# Patient Record
Sex: FEMALE | Race: WHITE | NOT HISPANIC OR LATINO | URBAN - METROPOLITAN AREA
[De-identification: names, ages, dates, MRNs, and addresses within clinical notes are randomized per-mention and may not be internally consistent; named-entity substitution may affect disease eponyms.]

---

## 2019-03-14 ENCOUNTER — OUTPATIENT (OUTPATIENT)
Dept: OUTPATIENT SERVICES | Facility: HOSPITAL | Age: 24
LOS: 1 days | Discharge: HOME | End: 2019-03-14

## 2019-03-14 DIAGNOSIS — R10.10 UPPER ABDOMINAL PAIN, UNSPECIFIED: ICD-10-CM

## 2019-08-08 ENCOUNTER — INPATIENT (INPATIENT)
Facility: HOSPITAL | Age: 24
LOS: 0 days | Discharge: LEFT BEFORE DISCHARGE | End: 2019-08-09
Attending: INTERNAL MEDICINE | Admitting: INTERNAL MEDICINE
Payer: COMMERCIAL

## 2019-08-08 VITALS
HEART RATE: 99 BPM | SYSTOLIC BLOOD PRESSURE: 147 MMHG | RESPIRATION RATE: 16 BRPM | OXYGEN SATURATION: 98 % | DIASTOLIC BLOOD PRESSURE: 73 MMHG | TEMPERATURE: 99 F

## 2019-08-08 DIAGNOSIS — Z90.49 ACQUIRED ABSENCE OF OTHER SPECIFIED PARTS OF DIGESTIVE TRACT: Chronic | ICD-10-CM

## 2019-08-08 LAB
ALBUMIN SERPL ELPH-MCNC: 3.5 G/DL — SIGNIFICANT CHANGE UP (ref 3.5–5.2)
ALP SERPL-CCNC: 153 U/L — HIGH (ref 30–115)
ALT FLD-CCNC: 13 U/L — SIGNIFICANT CHANGE UP (ref 0–41)
ANION GAP SERPL CALC-SCNC: 12 MMOL/L — SIGNIFICANT CHANGE UP (ref 7–14)
APPEARANCE UR: ABNORMAL
APTT BLD: 25.4 SEC — LOW (ref 27–39.2)
AST SERPL-CCNC: 15 U/L — SIGNIFICANT CHANGE UP (ref 0–41)
BACTERIA # UR AUTO: ABNORMAL
BASOPHILS # BLD AUTO: 0.02 K/UL — SIGNIFICANT CHANGE UP (ref 0–0.2)
BASOPHILS NFR BLD AUTO: 0.1 % — SIGNIFICANT CHANGE UP (ref 0–1)
BILIRUB SERPL-MCNC: 0.2 MG/DL — SIGNIFICANT CHANGE UP (ref 0.2–1.2)
BILIRUB UR-MCNC: NEGATIVE — SIGNIFICANT CHANGE UP
BUN SERPL-MCNC: 8 MG/DL — LOW (ref 10–20)
CALCIUM SERPL-MCNC: 9 MG/DL — SIGNIFICANT CHANGE UP (ref 8.5–10.1)
CHLORIDE SERPL-SCNC: 105 MMOL/L — SIGNIFICANT CHANGE UP (ref 98–110)
CO2 SERPL-SCNC: 21 MMOL/L — SIGNIFICANT CHANGE UP (ref 17–32)
COLOR SPEC: YELLOW — SIGNIFICANT CHANGE UP
CREAT SERPL-MCNC: 0.6 MG/DL — LOW (ref 0.7–1.5)
DIFF PNL FLD: NEGATIVE — SIGNIFICANT CHANGE UP
EOSINOPHIL # BLD AUTO: 0.07 K/UL — SIGNIFICANT CHANGE UP (ref 0–0.7)
EOSINOPHIL NFR BLD AUTO: 0.5 % — SIGNIFICANT CHANGE UP (ref 0–8)
EPI CELLS # UR: 12 /HPF — HIGH (ref 0–5)
GLUCOSE SERPL-MCNC: 81 MG/DL — SIGNIFICANT CHANGE UP (ref 70–99)
GLUCOSE UR QL: NEGATIVE — SIGNIFICANT CHANGE UP
HCT VFR BLD CALC: 34.8 % — LOW (ref 37–47)
HGB BLD-MCNC: 11.5 G/DL — LOW (ref 12–16)
HYALINE CASTS # UR AUTO: 13 /LPF — HIGH (ref 0–7)
IMM GRANULOCYTES NFR BLD AUTO: 0.7 % — HIGH (ref 0.1–0.3)
INR BLD: 0.93 RATIO — SIGNIFICANT CHANGE UP (ref 0.65–1.3)
KETONES UR-MCNC: NEGATIVE — SIGNIFICANT CHANGE UP
LEUKOCYTE ESTERASE UR-ACNC: ABNORMAL
LYMPHOCYTES # BLD AUTO: 26.5 % — SIGNIFICANT CHANGE UP (ref 20.5–51.1)
LYMPHOCYTES # BLD AUTO: 3.67 K/UL — HIGH (ref 1.2–3.4)
MCHC RBC-ENTMCNC: 26.3 PG — LOW (ref 27–31)
MCHC RBC-ENTMCNC: 33 G/DL — SIGNIFICANT CHANGE UP (ref 32–37)
MCV RBC AUTO: 79.6 FL — LOW (ref 81–99)
MONOCYTES # BLD AUTO: 0.87 K/UL — HIGH (ref 0.1–0.6)
MONOCYTES NFR BLD AUTO: 6.3 % — SIGNIFICANT CHANGE UP (ref 1.7–9.3)
NEUTROPHILS # BLD AUTO: 9.11 K/UL — HIGH (ref 1.4–6.5)
NEUTROPHILS NFR BLD AUTO: 65.9 % — SIGNIFICANT CHANGE UP (ref 42.2–75.2)
NITRITE UR-MCNC: NEGATIVE — SIGNIFICANT CHANGE UP
NRBC # BLD: 0 /100 WBCS — SIGNIFICANT CHANGE UP (ref 0–0)
PH UR: 6.5 — SIGNIFICANT CHANGE UP (ref 5–8)
PLATELET # BLD AUTO: 318 K/UL — SIGNIFICANT CHANGE UP (ref 130–400)
POTASSIUM SERPL-MCNC: 4.5 MMOL/L — SIGNIFICANT CHANGE UP (ref 3.5–5)
POTASSIUM SERPL-SCNC: 4.5 MMOL/L — SIGNIFICANT CHANGE UP (ref 3.5–5)
PROT SERPL-MCNC: 6.8 G/DL — SIGNIFICANT CHANGE UP (ref 6–8)
PROT UR-MCNC: SIGNIFICANT CHANGE UP
PROTHROM AB SERPL-ACNC: 10.7 SEC — SIGNIFICANT CHANGE UP (ref 9.95–12.87)
RBC # BLD: 4.37 M/UL — SIGNIFICANT CHANGE UP (ref 4.2–5.4)
RBC # FLD: 13.7 % — SIGNIFICANT CHANGE UP (ref 11.5–14.5)
RBC CASTS # UR COMP ASSIST: 5 /HPF — HIGH (ref 0–4)
SODIUM SERPL-SCNC: 138 MMOL/L — SIGNIFICANT CHANGE UP (ref 135–146)
SP GR SPEC: 1.01 — SIGNIFICANT CHANGE UP (ref 1.01–1.02)
TROPONIN T SERPL-MCNC: <0.01 NG/ML — SIGNIFICANT CHANGE UP
UROBILINOGEN FLD QL: SIGNIFICANT CHANGE UP
WBC # BLD: 13.84 K/UL — HIGH (ref 4.8–10.8)
WBC # FLD AUTO: 13.84 K/UL — HIGH (ref 4.8–10.8)
WBC UR QL: 51 /HPF — HIGH (ref 0–5)

## 2019-08-08 PROCEDURE — 93010 ELECTROCARDIOGRAM REPORT: CPT

## 2019-08-08 PROCEDURE — 99285 EMERGENCY DEPT VISIT HI MDM: CPT

## 2019-08-08 PROCEDURE — 70450 CT HEAD/BRAIN W/O DYE: CPT | Mod: 26

## 2019-08-08 PROCEDURE — 71045 X-RAY EXAM CHEST 1 VIEW: CPT | Mod: 26

## 2019-08-08 PROCEDURE — 99253 IP/OBS CNSLTJ NEW/EST LOW 45: CPT

## 2019-08-08 RX ORDER — OXYTOCIN 10 UNIT/ML
333.33 VIAL (ML) INJECTION
Qty: 20 | Refills: 0 | Status: DISCONTINUED | OUTPATIENT
Start: 2019-08-08 | End: 2019-08-08

## 2019-08-08 RX ORDER — NITROFURANTOIN MACROCRYSTAL 50 MG
100 CAPSULE ORAL
Refills: 0 | Status: DISCONTINUED | OUTPATIENT
Start: 2019-08-08 | End: 2019-08-09

## 2019-08-08 RX ORDER — CHLORHEXIDINE GLUCONATE 213 G/1000ML
1 SOLUTION TOPICAL
Refills: 0 | Status: DISCONTINUED | OUTPATIENT
Start: 2019-08-08 | End: 2019-08-09

## 2019-08-08 RX ORDER — SODIUM CHLORIDE 9 MG/ML
1000 INJECTION, SOLUTION INTRAVENOUS
Refills: 0 | Status: DISCONTINUED | OUTPATIENT
Start: 2019-08-08 | End: 2019-08-08

## 2019-08-08 NOTE — ED PROVIDER NOTE - OBJECTIVE STATEMENT
24 year old female  35 weeks pregnant presents to the ED with right sided facial and arm weakness, associated with difficulty speaking. Symptoms started acutely 40 minutes prior to arrival, lasted about 10 minutes and have not returned. Patient states she tried to speak but couldn't get the words out. Denies headaches, vision changes, neck pain/stiffness, chest pain, shortness of breath, abd pain, weakness, gait difficulty, facial droop, syncope, AMS, seizure activity. No prior hx of this. No anticoagulation.

## 2019-08-08 NOTE — ED PROVIDER NOTE - ATTENDING CONTRIBUTION TO CARE
25 y/o preg F at 35 wks p/w expressive aphasia and RUE and R facial numbness/ weakness about 40 min pta. resolved after 10 min. pt has no sx. No prior similar sx. No cp, sob, leg swelling, trauma. ROS PE above. stroke code called. Will fup imaging, labs, neuro reccs

## 2019-08-08 NOTE — H&P ADULT - ASSESSMENT
-24 year old female with history of migraine,  36 weeks pregnant presenting with transient aphasia and right sided numbness.          #  transient aphasia and right sided numbness.    DDx: TIA, complicated migraine, seizure  - NIHSS 0  - Admit to stroke unit  - Neurochecks q4 hours and vitals q4 hours   - ECHO   - MRI brain w/o FRANCIS   - MRA H+N w/o FRANCIS  - F/U hypercoagulable panel  - Call stroke code for any changes in neuro exam    # 36 week pregnant   -OB evaluation and ultrasound    # Regular Diet   # FULL CODE  # Dispo: from home -24 year old female with history of migraine,  36 weeks pregnant presenting with transient aphasia and right sided numbness.          #  transient aphasia and right sided numbness.    DDx: TIA, complicated migraine, seizure  - NIHSS 0  - Admit to stroke unit  - Neurochecks q4 hours and vitals q4 hours   - ECHO   - MRI brain w/o FRANCIS   - MRA H+N w/o FRANCIS  - F/U hypercoagulable panel  - Call stroke code for any changes in neuro exam    # Asymptomatic bacteriuria in pregnant pt  - nitrofurantoin    # 36 week pregnant   -OB evaluation and ultrasound    # Regular Diet   # FULL CODE  # Dispo: from home

## 2019-08-08 NOTE — ED ADULT TRIAGE NOTE - CHIEF COMPLAINT QUOTE
numbness, aphasia and slurred speech 30 mins ago, is currently 36 week pregnant, no acute distress at this time. no neuro deficit at this time, GCS of 15, stroke code activated.

## 2019-08-08 NOTE — ED PROVIDER NOTE - PHYSICAL EXAMINATION
VITALS:  I have reviewed the initial vital signs.  GENERAL: Well-developed, well-nourished, in no acute distress. Nontoxic.   HEENT: Sclera clear. No conjunctival injection. EOMI, PERRLA. Mucous membranes moist.  NECK: supple w FROM. No cervical adenopathy.  CARDIO: RRR, nl S1 and S2. No murmurs, rubs, or gallops. No peripheral edema. 2+ radial pulses bilaterally.  PULM: Normal effort. No tachypnea or retractions. CTA b/l without wheezes, rales, or rhonchi.  MSK: FROM to extremities x4. No joint swelling, erythema, or ttp.  GI: Normal bowel sounds. Abdomen soft, gravid, nontender throughout.   : No CVA tenderness b/l.  SKIN: Warm, dry. No pallor or rashes. Capillary refill <2 seconds.  NEURO: A&Ox3. Speech clear. CN II-XII intact. 5/5 strength to upper and lower extremities b/l. Sensation intact and equal throughout. No pronator drift. Finger to nose intact. Normal heel to shin.

## 2019-08-08 NOTE — ED PROVIDER NOTE - CLINICAL SUMMARY MEDICAL DECISION MAKING FREE TEXT BOX
25 y/o preg F w/ apparent TIA. CTH neg. NO acute ED events. pt admitted to stroke unit for further w/up.

## 2019-08-08 NOTE — H&P ADULT - HISTORY OF PRESENT ILLNESS
24 year old female with past medical history of Migraine, 36 weeks pregnant presents with episode  having difficulty speaking and right face, tongue and arm numbness.   patient was doing well until 30 mins prior to presentation when she had an episode of 10 mins in which she had difficulty speaking, associated with face tounge and arm numbness.   Symptoms lasted for 10 mins and resolved prior to arrival to the ED. She had a mild frontal headache afterwards.  She has a history in 2014 of having an episode in which she was confused while at work and lost vision in her right eye.  Was told it was a migraine.    ROS is otherwise negative.   IN ED, vital signs were stable, CTH was unremarkable and  NIHSS was 0 24 year old female with past medical history of Migraine, 36 weeks pregnant presents with episode  having difficulty speaking and right face, tongue and arm numbness.   patient was doing well until 30 mins prior to presentation when she had an episode of difficulty speaking, associated with face tounge and arm numbness, Symptoms lasted for 10 mins and resolved prior to arrival to the ED. She had a mild frontal headache afterwards.  She has a history in 2014 of having an episode in which she was confused while at work and lost vision in her right eye.  Was told it was a migraine.    ROS is otherwise negative.   IN ED, vital signs were stable, CTH was unremarkable and  NIHSS was 0

## 2019-08-08 NOTE — H&P ADULT - NSHPPHYSICALEXAM_GEN_ALL_CORE
PHYSICAL EXAM:  GENERAL: NAD, lying in bed comfortably  HEAD:  Atraumatic, Normocephalic  EYES: EOMI, PERRLA, conjunctiva and sclera clear  ENT: Moist mucous membranes  NECK: Supple, No JVD  CHEST/LUNG: Clear to auscultation bilaterally; No rales, rhonchi, wheezing, or rubs. Unlabored respirations  HEART: Regular rate and rhythm; No murmurs, rubs, or gallops  ABDOMEN: Soft, Nontender, pregnant uterus  EXTREMITIES:  2+ Peripheral Pulses, brisk capillary refill. No clubbing, cyanosis, or edema  NERVOUS SYSTEM:  Alert & Oriented X3, speech clear. No deficits   MSK: FROM all 4 extremities, full and equal strength  SKIN: No rashes or lesions

## 2019-08-08 NOTE — ED ADULT NURSE NOTE - OBJECTIVE STATEMENT
23 y/o female brought in for tingling. patient states at 345pm she developed tongue, right face, right hand numbness/tingling with slurred speech. Patient state symptoms resolved on its own in 15 minutes. Patient is currently 36 weeks pregnant, , no pmh. Patient denies chest pain, dizziness, headache, confusion, weakness.

## 2019-08-08 NOTE — H&P ADULT - ATTENDING COMMENTS
24 year old female with history of migraine,  36 weeks pregnant presenting with transient aphasia and right sided numbness, now completely resolved- likely due to complicated migraine    Pt seen and examined, spoke with     in excellent spirits; asymptomatic    chart reviewed- agree with above and neuro eval    stroke unit monitoring    neuro f/u    OB eval    plan as per neurology note    DC when cleared by neuro
No complaints

## 2019-08-08 NOTE — CONSULT NOTE ADULT - SUBJECTIVE AND OBJECTIVE BOX
**STROKE CODE CONSULT NOTE**    Last known well time/Time of onset of symptoms:    HPI: Shanna is currently 36 weeks pregnant and presents with episode while out with daughter in which she was having difficulty speaking and texting on her cell phone.  Says the text to her  did not make sense.  She then developed right face, tongue and arm numbness.  Symptoms resolved prior to arrival to the ED.  She had a mild frontal headache afterwards.  She has a history in 2014 of having an episode in which she was confused while at work and lost vision in her right eye.  Was told it was a migraine.  Currently asymptomatic but anxious and nervous    PAST MEDICAL & SURGICAL HISTORY:  No pertinent past medical history  No significant past surgical history      FAMILY HISTORY: non contributory      SOCIAL HISTORY: no smoking, drinking or drug use  Smoking Cesation: Discussed    ROS:  Constitutional: No fever, weight loss or fatigue  Eyes: No eye pain, visual disturbances, or discharge  ENMT:  No difficulty hearing, tinnitus, vertigo; No sinus or throat pain  Neck: No pain or stiffness  Respiratory: No cough, wheezing, chills or hemoptysis  Cardiovascular: No chest pain, palpitations, shortness of breath, dizziness or leg swelling  Gastrointestinal: No abdominal pain. No nausea, vomiting or hematemesis; No diarrhea or constipation. Nohematochezia.  Genitourinary: No dysuria, frequency, hematuria or incontinence  Neurological: As per HPI  Skin: No itching, burning, rashes or lesions   Endocrine: No heat or cold intolerance; No hair loss  Musculoskeletal: No joint pain or swelling; No muscle, back or extremity pain  Psychiatric: No depression, anxiety, mood swings or difficulty sleeping  Heme/Lymph: No easy bruising or bleeding gums    MEDICATIONS  (STANDING): none    MEDICATIONS  (PRN):      Allergies    No Known Allergies    Intolerances        Vital Signs Last 24 Hrs  T(C): 37 (08 Aug 2019 16:37), Max: 37 (08 Aug 2019 16:37)  T(F): 98.6 (08 Aug 2019 16:37), Max: 98.6 (08 Aug 2019 16:37)  HR: 114 (08 Aug 2019 16:55) (99 - 114)  BP: 140/76 (08 Aug 2019 16:55) (140/76 - 147/73)  BP(mean): --  RR: 20 (08 Aug 2019 16:55) (16 - 20)  SpO2: 100% (08 Aug 2019 16:55) (98% - 100%)    PHYSICAL EXAM:  Constitutional: WDWN; NAD  Cardiovascular: RRR, no appreciable murmurs; no carotid bruits  Neurologic:  Mental status: Awake, alert and oriented x3.  Recent and remote memory intact.  Naming, repetition and comprehension intact.  Attention/concentration intact.  No dysarthria, no aphasia.  Fund of knowledge appropriate.    Cranial nerves: pupils equally round and reactive to light, visual fields full, no nystagmus, extraocular muscles intact, V1 through V3 intact bilaterally and symmetric, face symmetric, hearing intact to finger rub, palate elevation symmetric, tongue was midline, sternocleidomastoid/shoulder shrug strength bilaterally 5/5.    Motor:  Normal bulk and tone, strength 5/5 in bilateral upper and lower extremities.   strength 5/5.  Rapid alternating movements intact and symmetric.   Sensation: Intact to light touch, proprioception, and pinprick.  No neglect.   Coordination: No dysmetria on finger-to-nose and heel-to-shin.  No clumsiness.  Reflexes: 2+ in upper and lower extremities, downgoing toes bilaterally  Gait: Narrow and steady. No ataxia.  Romberg negative    NIHSS: 0   mrankin 0    Fingerstick Blood Glucose: CAPILLARY BLOOD GLUCOSE      POCT Blood Glucose.: 82 mg/dL (08 Aug 2019 16:54)       LABS:                        11.5   13.84 )-----------( 318      ( 08 Aug 2019 16:54 )             34.8         RADIOLOGY & ADDITIONAL STUDIES:  < from: CT Brain Stroke Protocol (08.08.19 @ 17:03) >  Impression:      No mass effect or intracranial hemorrhage. No CT evidence of acute large   territorial infarction.    Left sphenoid sinus air-fluid level which can be seen with acute   sinusitis in the appropriate clinical setting.    < end of copied text >      IV-tPA (N): NIHSS 0 and 36 weeks pregnant                                   ASSESSMENT/PLAN: Patient is 36 weeks pregnant presenting with transient aphasia and right sided numbness.  DDx: TIA, complicated migraine, seizure  1. Admit to stroke unit  2. Neurochecks q4 hours and vitals q4 hours   3. OB evaluation and ultrasound  4. ECHO   5. MRI brain w/o FRANCIS MRA H+N w/o FRANCIS  6. check blood work urine  7. Send hypercoagulable panel  8. Call stroke code for any changes in neuro exam **STROKE CODE CONSULT NOTE**    Last known well time/Time of onset of symptoms: 4:20pm    HPI: Shanna is currently 36 weeks pregnant and presents with episode while out with daughter in which she was having difficulty speaking and texting on her cell phone.  Says the text to her  did not make sense.  She then developed right face, tongue and arm numbness.  Symptoms resolved prior to arrival to the ED.  She had a mild frontal headache afterwards.  She has a history in 2014 of having an episode in which she was confused while at work and lost vision in her right eye.  Was told it was a migraine.  Currently asymptomatic but anxious and nervous    PAST MEDICAL & SURGICAL HISTORY:  No pertinent past medical history  No significant past surgical history      FAMILY HISTORY: non contributory      SOCIAL HISTORY: no smoking, drinking or drug use  Smoking Cesation: Discussed    ROS:  Constitutional: No fever, weight loss or fatigue  Eyes: No eye pain, visual disturbances, or discharge  ENMT:  No difficulty hearing, tinnitus, vertigo; No sinus or throat pain  Neck: No pain or stiffness  Respiratory: No cough, wheezing, chills or hemoptysis  Cardiovascular: No chest pain, palpitations, shortness of breath, dizziness or leg swelling  Gastrointestinal: No abdominal pain. No nausea, vomiting or hematemesis; No diarrhea or constipation. Nohematochezia.  Genitourinary: No dysuria, frequency, hematuria or incontinence  Neurological: As per HPI  Skin: No itching, burning, rashes or lesions   Endocrine: No heat or cold intolerance; No hair loss  Musculoskeletal: No joint pain or swelling; No muscle, back or extremity pain  Psychiatric: No depression, anxiety, mood swings or difficulty sleeping  Heme/Lymph: No easy bruising or bleeding gums    MEDICATIONS  (STANDING): none    MEDICATIONS  (PRN):      Allergies    No Known Allergies    Intolerances        Vital Signs Last 24 Hrs  T(C): 37 (08 Aug 2019 16:37), Max: 37 (08 Aug 2019 16:37)  T(F): 98.6 (08 Aug 2019 16:37), Max: 98.6 (08 Aug 2019 16:37)  HR: 114 (08 Aug 2019 16:55) (99 - 114)  BP: 140/76 (08 Aug 2019 16:55) (140/76 - 147/73)  BP(mean): --  RR: 20 (08 Aug 2019 16:55) (16 - 20)  SpO2: 100% (08 Aug 2019 16:55) (98% - 100%)    PHYSICAL EXAM:  Constitutional: WDWN; NAD  Cardiovascular: RRR, no appreciable murmurs; no carotid bruits  Neurologic:  Mental status: Awake, alert and oriented x3.  Recent and remote memory intact.  Naming, repetition and comprehension intact.  Attention/concentration intact.  No dysarthria, no aphasia.  Fund of knowledge appropriate.    Cranial nerves: pupils equally round and reactive to light, visual fields full, no nystagmus, extraocular muscles intact, V1 through V3 intact bilaterally and symmetric, face symmetric, hearing intact to finger rub, palate elevation symmetric, tongue was midline, sternocleidomastoid/shoulder shrug strength bilaterally 5/5.    Motor:  Normal bulk and tone, strength 5/5 in bilateral upper and lower extremities.   strength 5/5.  Rapid alternating movements intact and symmetric.   Sensation: Intact to light touch, proprioception, and pinprick.  No neglect.   Coordination: No dysmetria on finger-to-nose and heel-to-shin.  No clumsiness.  Reflexes: 2+ in upper and lower extremities, downgoing toes bilaterally  Gait: Narrow and steady. No ataxia.  Romberg negative    NIHSS: 0   mrankin 0    Fingerstick Blood Glucose: CAPILLARY BLOOD GLUCOSE      POCT Blood Glucose.: 82 mg/dL (08 Aug 2019 16:54)       LABS:                        11.5   13.84 )-----------( 318      ( 08 Aug 2019 16:54 )             34.8         RADIOLOGY & ADDITIONAL STUDIES:  < from: CT Brain Stroke Protocol (08.08.19 @ 17:03) >  Impression:      No mass effect or intracranial hemorrhage. No CT evidence of acute large   territorial infarction.    Left sphenoid sinus air-fluid level which can be seen with acute   sinusitis in the appropriate clinical setting.    < end of copied text >      IV-tPA (N): NIHSS 0 and 36 weeks pregnant                                   ASSESSMENT/PLAN: Patient is 36 weeks pregnant presenting with transient aphasia and right sided numbness.  DDx: TIA, complicated migraine, seizure  1. Admit to stroke unit  2. Neurochecks q4 hours and vitals q4 hours   3. OB evaluation and ultrasound  4. ECHO   5. MRI brain w/o FRANCIS MRA H+N w/o FRANCIS  6. check blood work urine  7. Send hypercoagulable panel  8. Call stroke code for any changes in neuro exam

## 2019-08-08 NOTE — H&P ADULT - NSHPLABSRESULTS_GEN_ALL_CORE
11.5   13.84 )-----------( 318      ( 08 Aug 2019 16:54 )             34.8           138  |  105  |  8<L>  ----------------------------<  81  4.5   |  21  |  0.6<L>    Ca    9.0      08 Aug 2019 16:54    TPro  6.8  /  Alb  3.5  /  TBili  0.2  /  DBili  x   /  AST  15  /  ALT  13  /  AlkPhos  153<H>                Urinalysis Basic - ( 08 Aug 2019 18:00 )    Color: Yellow / Appearance: Slightly Turbid / S.015 / pH: x  Gluc: x / Ketone: Negative  / Bili: Negative / Urobili: <2 mg/dL   Blood: x / Protein: Trace / Nitrite: Negative   Leuk Esterase: Large / RBC: 5 /HPF / WBC 51 /HPF   Sq Epi: x / Non Sq Epi: 12 /HPF / Bacteria: Moderate        PT/INR - ( 08 Aug 2019 16:54 )   PT: 10.70 sec;   INR: 0.93 ratio         PTT - ( 08 Aug 2019 16:54 )  PTT:25.4 sec    Lactate Trend      CARDIAC MARKERS ( 08 Aug 2019 16:54 )  x     / <0.01 ng/mL / x     / x     / x            CAPILLARY BLOOD GLUCOSE      POCT Blood Glucose.: 82 mg/dL (08 Aug 2019 16:54)    < from: CT Brain Stroke Protocol (19 @ 17:03) >      No mass effect or intracranial hemorrhage. No CT evidence of acute large   territorial infarction.    Left sphenoid sinus air-fluid level which can be seen with acute   sinusitis in the appropriate clinical setting.

## 2019-08-08 NOTE — ED ADULT NURSE NOTE - CHPI ED NUR SYMPTOMS NEG
no numbness/no vomiting/no loss of consciousness/no blurred vision/no dizziness/no fever/no weakness/no change in level of consciousness/no confusion/no nausea

## 2019-08-08 NOTE — ED PROVIDER NOTE - PROGRESS NOTE DETAILS
TERENCE: Joe Mojica at bedside. Transport here to take patient to CT. TERENCE: renee Mojica, admit to stroke unit.

## 2019-08-08 NOTE — ED PROVIDER NOTE - NS ED ROS FT
CONSTITUTIONAL: (-) fevers, (-) chills, (-) decreased appetite, (-) diaphoresis  EYES: (-) vision changes, (-) blurry vision, (-) double vision  ENT: (-) congestion, (-) rhinorrhea, (-) sinus pain, (-) sore throat  NECK: (-) neck pain, (-) neck stiffness  CARDIO: (-) chest pain, (-) palpitations, (-) edema  PULM: (-) cough, (-) sputum, (-) shortness of breath  GI: (-) nausea, (-) vomiting, (-) diarrhea, (-) abdominal pain  : (-) dysuria, (-) hematuria, (-) frequency, (-) urgency, (-) incontinence, (-) difficulty urinating, (-) urinary retention, (-) flank pain, (-) vaginal discharge, (-) abnormal vaginal bleeding  ENDO: (-) polyuria, (-) polydipsia  MSK: (-) back pain, (-) gait difficulty  SKIN: (-) rashes, (-) wounds, (-) pallor  NEURO: see HPI, (-) headache, (-) head injury, (-) LOC, (-) dizziness, (-) lightheadedness, (-) syncope, (-) speech changes, (-) confusion, (-) weakness, (-) seizures    *all other systems negative except as documented above and in the HPI*

## 2019-08-09 VITALS
DIASTOLIC BLOOD PRESSURE: 65 MMHG | SYSTOLIC BLOOD PRESSURE: 151 MMHG | HEART RATE: 97 BPM | RESPIRATION RATE: 18 BRPM | TEMPERATURE: 98 F

## 2019-08-09 LAB
ALBUMIN SERPL ELPH-MCNC: 3 G/DL — LOW (ref 3.5–5.2)
ALP SERPL-CCNC: 131 U/L — HIGH (ref 30–115)
ALT FLD-CCNC: 11 U/L — SIGNIFICANT CHANGE UP (ref 0–41)
ANION GAP SERPL CALC-SCNC: 11 MMOL/L — SIGNIFICANT CHANGE UP (ref 7–14)
APPEARANCE UR: ABNORMAL
AST SERPL-CCNC: 13 U/L — SIGNIFICANT CHANGE UP (ref 0–41)
BACTERIA # UR AUTO: ABNORMAL
BASOPHILS # BLD AUTO: 0.02 K/UL — SIGNIFICANT CHANGE UP (ref 0–0.2)
BASOPHILS NFR BLD AUTO: 0.2 % — SIGNIFICANT CHANGE UP (ref 0–1)
BILIRUB SERPL-MCNC: <0.2 MG/DL — SIGNIFICANT CHANGE UP (ref 0.2–1.2)
BILIRUB UR-MCNC: NEGATIVE — SIGNIFICANT CHANGE UP
BUN SERPL-MCNC: 9 MG/DL — LOW (ref 10–20)
CALCIUM SERPL-MCNC: 8.5 MG/DL — SIGNIFICANT CHANGE UP (ref 8.5–10.1)
CHLORIDE SERPL-SCNC: 105 MMOL/L — SIGNIFICANT CHANGE UP (ref 98–110)
CO2 SERPL-SCNC: 22 MMOL/L — SIGNIFICANT CHANGE UP (ref 17–32)
COLOR SPEC: YELLOW — SIGNIFICANT CHANGE UP
CREAT SERPL-MCNC: 0.5 MG/DL — LOW (ref 0.7–1.5)
DIFF PNL FLD: NEGATIVE — SIGNIFICANT CHANGE UP
EOSINOPHIL # BLD AUTO: 0.1 K/UL — SIGNIFICANT CHANGE UP (ref 0–0.7)
EOSINOPHIL NFR BLD AUTO: 0.9 % — SIGNIFICANT CHANGE UP (ref 0–8)
EPI CELLS # UR: 5 /HPF — SIGNIFICANT CHANGE UP (ref 0–5)
GLUCOSE SERPL-MCNC: 88 MG/DL — SIGNIFICANT CHANGE UP (ref 70–99)
GLUCOSE UR QL: NEGATIVE — SIGNIFICANT CHANGE UP
HCT VFR BLD CALC: 31.6 % — LOW (ref 37–47)
HCYS SERPL-MCNC: 6.1 UMOL/L — SIGNIFICANT CHANGE UP
HGB BLD-MCNC: 10.2 G/DL — LOW (ref 12–16)
HYALINE CASTS # UR AUTO: 6 /LPF — SIGNIFICANT CHANGE UP (ref 0–7)
IMM GRANULOCYTES NFR BLD AUTO: 0.5 % — HIGH (ref 0.1–0.3)
KETONES UR-MCNC: NEGATIVE — SIGNIFICANT CHANGE UP
LEUKOCYTE ESTERASE UR-ACNC: ABNORMAL
LYMPHOCYTES # BLD AUTO: 28.9 % — SIGNIFICANT CHANGE UP (ref 20.5–51.1)
LYMPHOCYTES # BLD AUTO: 3.33 K/UL — SIGNIFICANT CHANGE UP (ref 1.2–3.4)
MCHC RBC-ENTMCNC: 25.8 PG — LOW (ref 27–31)
MCHC RBC-ENTMCNC: 32.3 G/DL — SIGNIFICANT CHANGE UP (ref 32–37)
MCV RBC AUTO: 80 FL — LOW (ref 81–99)
MONOCYTES # BLD AUTO: 0.78 K/UL — HIGH (ref 0.1–0.6)
MONOCYTES NFR BLD AUTO: 6.8 % — SIGNIFICANT CHANGE UP (ref 1.7–9.3)
NEUTROPHILS # BLD AUTO: 7.24 K/UL — HIGH (ref 1.4–6.5)
NEUTROPHILS NFR BLD AUTO: 62.7 % — SIGNIFICANT CHANGE UP (ref 42.2–75.2)
NITRITE UR-MCNC: NEGATIVE — SIGNIFICANT CHANGE UP
NRBC # BLD: 0 /100 WBCS — SIGNIFICANT CHANGE UP (ref 0–0)
PH UR: 7 — SIGNIFICANT CHANGE UP (ref 5–8)
PLATELET # BLD AUTO: 262 K/UL — SIGNIFICANT CHANGE UP (ref 130–400)
POTASSIUM SERPL-MCNC: 3.9 MMOL/L — SIGNIFICANT CHANGE UP (ref 3.5–5)
POTASSIUM SERPL-SCNC: 3.9 MMOL/L — SIGNIFICANT CHANGE UP (ref 3.5–5)
PROT SERPL-MCNC: 5.9 G/DL — LOW (ref 6–8)
PROT UR-MCNC: SIGNIFICANT CHANGE UP
RBC # BLD: 3.95 M/UL — LOW (ref 4.2–5.4)
RBC # FLD: 13.8 % — SIGNIFICANT CHANGE UP (ref 11.5–14.5)
RBC CASTS # UR COMP ASSIST: 4 /HPF — SIGNIFICANT CHANGE UP (ref 0–4)
SODIUM SERPL-SCNC: 138 MMOL/L — SIGNIFICANT CHANGE UP (ref 135–146)
SP GR SPEC: 1.02 — SIGNIFICANT CHANGE UP (ref 1.01–1.02)
UROBILINOGEN FLD QL: ABNORMAL
WBC # BLD: 11.53 K/UL — HIGH (ref 4.8–10.8)
WBC # FLD AUTO: 11.53 K/UL — HIGH (ref 4.8–10.8)
WBC UR QL: 23 /HPF — HIGH (ref 0–5)

## 2019-08-09 PROCEDURE — 95819 EEG AWAKE AND ASLEEP: CPT | Mod: 26

## 2019-08-09 PROCEDURE — 99232 SBSQ HOSP IP/OBS MODERATE 35: CPT

## 2019-08-09 PROCEDURE — 93306 TTE W/DOPPLER COMPLETE: CPT | Mod: 26

## 2019-08-09 PROCEDURE — 70547 MR ANGIOGRAPHY NECK W/O DYE: CPT | Mod: 26

## 2019-08-09 PROCEDURE — 70544 MR ANGIOGRAPHY HEAD W/O DYE: CPT | Mod: 26,59

## 2019-08-09 PROCEDURE — 70551 MRI BRAIN STEM W/O DYE: CPT | Mod: 26

## 2019-08-09 RX ORDER — NITROFURANTOIN MACROCRYSTAL 50 MG
1 CAPSULE ORAL
Qty: 6 | Refills: 0
Start: 2019-08-09 | End: 2019-08-11

## 2019-08-09 RX ADMIN — Medication 100 MILLIGRAM(S): at 14:13

## 2019-08-09 RX ADMIN — Medication 1 TABLET(S): at 14:14

## 2019-08-09 NOTE — CONSULT NOTE ADULT - ASSESSMENT
Incomplete 24yo  at 36w1d, with hx of migraine, admitted for work up for aphasia and right sided numbness, with elevated BP during admission, maternal and fetal status reassuring    -Care per primary team, GYN to follow  -Recommend cardia echo  -SCDs  -Vitals [id number]  -NST BID    Dr. Kendrick aware. Will inform Dr. Diez 24yo  at 36w1d, with hx of migraine, admitted for work up for aphasia and right sided numbness, with and isolated elevated BP during admission, maternal and fetal status reassuring,    -Care per primary team, GYN to follow  -Recommend CBC, CMP, Uric acid(serum), LDH, Urine protein/creatinine for preeclamptic work up  -Recommend cardiac echo  -SCDs  -Vitals [id number], will consider blood pressure monitoring if another BP is elevated  -Non-stress test once per day while inhouse      Dr. Kendrick aware. Dr. Diez made aware

## 2019-08-09 NOTE — DISCHARGE NOTE NURSING/CASE MANAGEMENT/SOCIAL WORK - NSDCDPATPORTLINK_GEN_ALL_CORE
You can access the [employer] Patient Portal, offered by [facility], by registering with the following website: [email or web address]

## 2019-08-09 NOTE — DISCHARGE NOTE PROVIDER - HOSPITAL COURSE
HPI:    24 year old female with past medical history of Migraine, 36 weeks pregnant presents with episode  having difficulty speaking and right face, tongue and arm numbness.     patient was doing well until 30 mins prior to presentation when she had an episode of difficulty speaking, associated with face tongue and arm numbness, Symptoms lasted for 10 mins and resolved prior to arrival to the ED. She had a mild frontal headache afterwards.  She has a history in 2014 of having an episode in which she was confused while at work and lost vision in her right eye.  Was told it was a migraine.      ROS is otherwise negative.     IN ED, vital signs were stable, CTH was unremarkable and  NIHSS was 0 (08 Aug 2019 20:15)        Patient was admitted to stroke unit and MRI/MRA was performed which came back negative. Patient also had an echo which showed no intra atrial defects. Patient was seen by the Obs/Gyn team who recommended following up with the Neuro recommendations. EEG was performed and it came back negative for any seizure activity. Patient also was found to have asymptomatic bacteriuria and was treated with nitrofurantoin. Patient did not have any neurological complaints during the stay in stroke unit and will be discharged with the diagnosis of Complicated Migraine.

## 2019-08-09 NOTE — DISCHARGE NOTE PROVIDER - CARE PROVIDER_API CALL
Beverly Puga)  Internal Medicine  [address] [place] [address]  Phone: [phone]  Fax: [phone]  Follow Up Time: 1 week

## 2019-08-09 NOTE — CONSULT NOTE ADULT - SUBJECTIVE AND OBJECTIVE BOX
HPI: 25yo  at 36w1d with EDC 19 dated by LMP 19 c/w first trimester sonogram per patient, admitted to medicine for work up for transient aphasia and right sided numbness. Patient reports an episode of difficulty speaking, loss of vision bilaterally and right sided numbness in the arm and right sides face around 1500 that lasted for 10min then resolved. Patient is getting work up for TIA vs complex migraines vs seizure. She reports a similar episode of loss of vision in one eye 5 years ago, and was told it was a migraine. Obstetrically, she denies contractions, leakage of fluids, vaginal bleeding and reports good fetal movements. She follows with [facility] and reports her pregnancy was only complicated by a cholecystectomy at 19w, otherwise uncomplicated. Denies fevers, chills, chest pain, SOB, headache, dizziness, vaginal discharge, dysuria, urinary symptoms, changes in bowel habits, extremity pain or swelling.   Patient also denies history of blood clots in herself or family members. She has history of using OCP's for 5 years, last was 4 years ago.      Denies the following: constitutional symptoms, visual symptoms, cardiovascular symptoms, respiratory symptoms, GI symptoms, musculoskeletal symptoms, skin symptoms, neurologic symptoms, hematologic symptoms, allergic symptoms, psychiatric symptoms  Except any pertinent positives listed.     Ob/Gyn History:                 LMP 19                  Denies history of ovarian cysts, uterine fibroids, abnormal paps, or STIs    PAST MEDICAL & SURGICAL HISTORY:  Migraine  S/P cholecystectomy    Home Medications: none    Allergies No Known Drug Allergies    FAMILY HISTORY:  FH: HTN (hypertension)    SOCIAL HISTORY: Denies cigarette use, alcohol use, or illicit drug use    Vital Signs Last 24 Hrs  T(F): 96.8 (09 Aug 2019 06:07), Max: 98.6 (08 Aug 2019 16:37)  HR: 72 (09 Aug 2019 06:07) (18 - 114)  BP: 122/64 (09 Aug 2019 06:07) (120/75 - 148/89)  RR: 16 (09 Aug 2019 06:07) (16 - 20)    General Appearance - AAOx3, NAD  Heart - S1S2 regular rate and rhythm. bedside HR 93bpm  Lung - CTA Bilaterally  Abdomen - Gravid. Soft, nontender, nondistended, no rebound, no rigidity, no guarding.   GYN/Pelvis: Deferred.    Bedside sonogram: tasha breech presentation, posterior placenta, Biophysical Profile , MVP 35mm, Estimated Fetal Weight 2944g (55%), FH seen       LABS:                        10.2   11.53 )-----------( 262      ( 09 Aug 2019 05:50 )             31.6             138  |  105  |  9<L>  ----------------------------<  88  3.9   |  22  |  0.5<L>    Ca    8.5      09 Aug 2019 05:50    TPro  5.9<L>  /  Alb  3.0<L>  /  TBili  <0.2  /  DBili  x   /  AST  13  /  ALT  11  /  AlkPhos  131<H>      PT/INR - ( 08 Aug 2019 16:54 )   PT: 10.70 sec;   INR: 0.93 ratio         PTT - ( 08 Aug 2019 16:54 )  PTT:25.4 sec  Urinalysis Basic - ( 08 Aug 2019 20:44 )    Color: Yellow / Appearance: Slightly Turbid / S.025 / pH: x  Gluc: x / Ketone: Negative  / Bili: Negative / Urobili: 3 mg/dL   Blood: x / Protein: Trace / Nitrite: Negative   Leuk Esterase: Moderate / RBC: 4 /HPF / WBC 23 /HPF   Sq Epi: x / Non Sq Epi: 5 /HPF / Bacteria: Few          RADIOLOGY & ADDITIONAL STUDIES:    EXAM:  CT BRAIN STROKE PROTOCOL            PROCEDURE DATE:  2019            INTERPRETATION:  Clinical History / Reason for exam:  Stroke Code    Technique:  Multiple contiguous axial CT images of the brain were   obtained from base to vertex without administration of intravenous   contrast.    Comparison:  Head CT 10/2/2014.    Findings:    Ventricular system: Age-appropriate.  Brain parenchyma: Unremarkable.  ASPECT score:10  Intracranial hemorrhage: None.  Mass effect/Midline shift: None.   Intracranial vessels: Unremarkable  Paranasal sinuses and mastoid air cells: There is an air-fluid level in   the left sphenoid sinus.  Osseous structures: Unremarkable.    Impression:    No mass effect or intracranial hemorrhage. No CT evidence of acute large   territorial infarction.  Left sphenoid sinus air-fluid level which can be seen with acute   sinusitis in the appropriate clinical setting.        19 @1900  Diagnosis Line Normal sinus rhythm  Normal ECG HPI: 23yo  at 36w1d with EDC 19 dated by LMP 19 c/w first trimester sonogram per patient, admitted to medicine for work up for transient aphasia and right sided numbness. Patient reports an episode of difficulty speaking, loss of vision bilaterally and right sided numbness in the arm and right sides face around 1500 that lasted for 10min then resolved. Patient is getting work up for TIA vs complex migraines vs seizure. She reports a similar episode of loss of vision in one eye 5 years ago, and was told it was a migraine. Obstetrically, she denies contractions, leakage of fluids, vaginal bleeding and reports good fetal movements. She follows with [facility] and reports her pregnancy was only complicated by a cholecystectomy at 19w, otherwise uncomplicated. Denies fevers, chills, chest pain, SOB, headache, dizziness, vaginal discharge, dysuria, urinary symptoms, changes in bowel habits, extremity pain or swelling.   Patient also denies history of blood clots in herself or family members. She has history of using OCP's for 5 years, last was 4 years ago.      Denies the following: constitutional symptoms, visual symptoms, cardiovascular symptoms, respiratory symptoms, GI symptoms, musculoskeletal symptoms, skin symptoms, neurologic symptoms, hematologic symptoms, allergic symptoms, psychiatric symptoms  Except any pertinent positives listed.     Ob/Gyn History:        full term 7lbs no complications            LMP 19                  Denies history of ovarian cysts, uterine fibroids, abnormal paps, or STIs    PAST MEDICAL & SURGICAL HISTORY:  Migraine  S/P cholecystectomy    Home Medications: none    Allergies No Known Drug Allergies    FAMILY HISTORY:  FH: HTN (hypertension)    SOCIAL HISTORY: Denies cigarette use, alcohol use, or illicit drug use    Vital Signs Last 24 Hrs  T(F): 96.8 (09 Aug 2019 06:07), Max: 98.6 (08 Aug 2019 16:37)  HR: 72 (09 Aug 2019 06:07) (18 - 114)  BP: 122/64 (09 Aug 2019 06:07) (120/75 - 148/89)  RR: 16 (09 Aug 2019 06:07) (16 - 20)    General Appearance - AAOx3, NAD  Heart - S1S2 regular rate and rhythm. bedside HR 93bpm  Lung - CTA Bilaterally  Abdomen - Gravid. Soft, nontender, nondistended, no rebound, no rigidity, no guarding.   GYN/Pelvis: Deferred.    Bedside sonogram: cephalic presentation, posterior placenta, Biophysical Profile , MVP 35mm, Estimated Fetal Weight 2944g (55%), FH seen       LABS:                        10.2   11.53 )-----------( 262      ( 09 Aug 2019 05:50 )             31.6             138  |  105  |  9<L>  ----------------------------<  88  3.9   |  22  |  0.5<L>    Ca    8.5      09 Aug 2019 05:50    TPro  5.9<L>  /  Alb  3.0<L>  /  TBili  <0.2  /  DBili  x   /  AST  13  /  ALT  11  /  AlkPhos  131<H>      PT/INR - ( 08 Aug 2019 16:54 )   PT: 10.70 sec;   INR: 0.93 ratio         PTT - ( 08 Aug 2019 16:54 )  PTT:25.4 sec  Urinalysis Basic - ( 08 Aug 2019 20:44 )    Color: Yellow / Appearance: Slightly Turbid / S.025 / pH: x  Gluc: x / Ketone: Negative  / Bili: Negative / Urobili: 3 mg/dL   Blood: x / Protein: Trace / Nitrite: Negative   Leuk Esterase: Moderate / RBC: 4 /HPF / WBC 23 /HPF   Sq Epi: x / Non Sq Epi: 5 /HPF / Bacteria: Few      RADIOLOGY & ADDITIONAL STUDIES:    EXAM:  CT BRAIN STROKE PROTOCOL            PROCEDURE DATE:  2019        INTERPRETATION:  Clinical History / Reason for exam:  Stroke Code    Technique:  Multiple contiguous axial CT images of the brain were   obtained from base to vertex without administration of intravenous   contrast.    Comparison:  Head CT 10/2/2014.    Findings:    Ventricular system: Age-appropriate.  Brain parenchyma: Unremarkable.  ASPECT score:10  Intracranial hemorrhage: None.  Mass effect/Midline shift: None.   Intracranial vessels: Unremarkable  Paranasal sinuses and mastoid air cells: There is an air-fluid level in   the left sphenoid sinus.  Osseous structures: Unremarkable.    Impression:    No mass effect or intracranial hemorrhage. No CT evidence of acute large   territorial infarction.  Left sphenoid sinus air-fluid level which can be seen with acute   sinusitis in the appropriate clinical setting.        19 @1900  Diagnosis Line Normal sinus rhythm  Normal ECG

## 2019-08-09 NOTE — DISCHARGE NOTE PROVIDER - NSDCFUADDINST_GEN_ALL_CORE_FT
Follow up with Obs/Gyn provider and take your antibiotic for the next 3 days. Follow up with Obs/Gyn provider and take your antibiotic for the next 3 days. Follow up with your Primary for the Coagulation workup.

## 2019-08-09 NOTE — CHART NOTE - NSCHARTNOTEFT_GEN_A_CORE
PGY2    Patient's blood pressures reviewed. Discussed findings with Dr. Diez, since patient had 2 isolated blood pressures in the mild range, if patient gets discharged tonight, she is to follow up at PMD's office on Monday, and if patient is to stay overnight, we recommend preeclamptic work up which includes: urine protein/creatinine, CBC, CMP, Uric acid(serum), LDH.       Discussed with Dr. Kendrick and Dr. Diez

## 2019-08-09 NOTE — DISCHARGE NOTE PROVIDER - NSDCCPCAREPLAN_GEN_ALL_CORE_FT
PRINCIPAL DISCHARGE DIAGNOSIS  Diagnosis: Complicated migraine  Assessment and Plan of Treatment: You came in with the complaints of transiet difficulty speaking, texting and has weakness of right face, tongue and arms. You came to ED where a CT was done to rule out a bleed and came back negative. You had an MRI/MRA done and was negative for any bleed or occlusion. Obs/Gyn were consulted and they recommended following the neurology recommendations. Neurology recommended EEG which came back normal and echo which didn't show any septal abnormalities. You didn't have the recurrence of any symptoms and will follow your Obs/Gyn as planned on Monday.      SECONDARY DISCHARGE DIAGNOSES  Diagnosis: Asymptomatic bacteriuria  Assessment and Plan of Treatment: Your urinanlysis came back positive. So because of your pregnancy you were treated with Nitrofurantoin 100mg twice a day. Please take the drug for 3 more days to complete the course.

## 2019-08-10 LAB
CULTURE RESULTS: SIGNIFICANT CHANGE UP
SPECIMEN SOURCE: SIGNIFICANT CHANGE UP

## 2019-08-12 LAB
AT III ACT/NOR PPP CHRO: 89 % — SIGNIFICANT CHANGE UP (ref 85–135)
B2 GLYCOPROT1 AB SER QL: NEGATIVE — SIGNIFICANT CHANGE UP
CARDIOLIPIN AB SER-ACNC: POSITIVE
CARDIOLIPIN IGM SER-MCNC: 5.7 GPL — SIGNIFICANT CHANGE UP (ref 0–12.5)
DEPRECATED CARDIOLIPIN IGA SER: <5 APL — SIGNIFICANT CHANGE UP (ref 0–12.5)

## 2019-08-13 LAB — APCR PPP: 3.05 RATIO — SIGNIFICANT CHANGE UP

## 2019-08-14 LAB — CARDIOLIPIN IGM SER-MCNC: 9 MPL — SIGNIFICANT CHANGE UP (ref 0–12.5)

## 2019-08-15 LAB
DRVVT SCREEN TO CONFIRM RATIO: SIGNIFICANT CHANGE UP
LA NT DPL PPP QL: SIGNIFICANT CHANGE UP
NORMALIZED SCT PPP-RTO: 0.74 RATIO — SIGNIFICANT CHANGE UP (ref 0–1.16)
NORMALIZED SCT PPP-RTO: SIGNIFICANT CHANGE UP
PROT C ACT/NOR PPP: 98 % — SIGNIFICANT CHANGE UP (ref 65–129)

## 2019-08-20 DIAGNOSIS — Z90.49 ACQUIRED ABSENCE OF OTHER SPECIFIED PARTS OF DIGESTIVE TRACT: ICD-10-CM

## 2019-08-20 DIAGNOSIS — Z3A.36 36 WEEKS GESTATION OF PREGNANCY: ICD-10-CM

## 2019-08-20 DIAGNOSIS — R47.01 APHASIA: ICD-10-CM

## 2019-08-20 DIAGNOSIS — R20.0 ANESTHESIA OF SKIN: ICD-10-CM

## 2019-08-20 DIAGNOSIS — O99.89 OTHER SPECIFIED DISEASES AND CONDITIONS COMPLICATING PREGNANCY, CHILDBIRTH AND THE PUERPERIUM: ICD-10-CM

## 2019-08-20 DIAGNOSIS — O99.353 DISEASES OF THE NERVOUS SYSTEM COMPLICATING PREGNANCY, THIRD TRIMESTER: ICD-10-CM

## 2019-08-20 DIAGNOSIS — G43.109 MIGRAINE WITH AURA, NOT INTRACTABLE, WITHOUT STATUS MIGRAINOSUS: ICD-10-CM

## 2019-08-20 DIAGNOSIS — R82.71 BACTERIURIA: ICD-10-CM

## 2020-08-19 NOTE — PATIENT PROFILE ADULT - NSPROHMSYMPCOND_GEN_A_NUR
Group Topic:  JENARO Education    Date: 8/17/2020  Start Time:  6:30 PM  End Time:  7:15 PM  Facilitators: Dirk Johnson LCSW    Date: 8/17/20  Start Time:  6:30PM  End Time:  7:15PM  Facilitators: Pasquale  Focus: Social Support  Number in attendance: 7    Description of group: Patient attended social support group. Educated on types of social supports, helping relationships, and different recovery-oriented support groups. Patient identified current positive supports and the qualities they look for in a positive support. Patient asked to think about what support they need to address the specific problem they are struggling with, primarily addiction and mental health.     Method: Group  Attendance: Present  Participation: Active  Patient Response: Attentive  Mood: Positive  Affect: Calm  Behavior/Socialization: Appropriate to group  Thought Process: Focused  Task Performance: Follows directions     Individual Patient Response to Group:  Patient identified with group topic. Patient notes wanting to look into recovery-oriented groups. Patient reassessing social support network and ways to improve.  Pasquale Johnson LCSW, [facility]  8/19/2020  This visit was performed via live interactive two-way video.    During their visit, the patient was informed that their consent to treat includes permission to submit claims to their insurance on their behalf for the services received.  Clinician Location: No information on file.    Patient Location: Home           none

## 2023-02-20 NOTE — ED ADULT NURSE NOTE - NIH STROKE SCALE: 5B. MOTOR ARM, RIGHT, QM
Please contact the general surgeon attached to your paperwork today to schedule an evaluation and consultation for your suspected hidradenitis. We are prescribing you doxycycline which will help with the infection to reduce the inflammation. Please take this medication as prescribed in its entirety. He can also use warm compresses to help facilitate the drainage through the tract that is already there. At this time we do not recommend lancing them to pop the abscess but only allow it to drain naturally. Monitor yourself for development of fevers, chills, shortness of breath, lightheadedness or dizziness is signs of worsening infection and return to emergency department for reevaluation. (0) No drift; limb holds 90 (or 45) degrees for full 10 secs

## 2024-10-27 ENCOUNTER — INPATIENT (INPATIENT)
Facility: HOSPITAL | Age: 29
LOS: 0 days | Discharge: HOME | DRG: 179 | End: 2024-10-28
Attending: STUDENT IN AN ORGANIZED HEALTH CARE EDUCATION/TRAINING PROGRAM | Admitting: STUDENT IN AN ORGANIZED HEALTH CARE EDUCATION/TRAINING PROGRAM
Payer: COMMERCIAL

## 2024-10-27 VITALS
RESPIRATION RATE: 18 BRPM | SYSTOLIC BLOOD PRESSURE: 178 MMHG | TEMPERATURE: 99 F | HEART RATE: 81 BPM | OXYGEN SATURATION: 98 % | DIASTOLIC BLOOD PRESSURE: 135 MMHG | WEIGHT: 266.76 LBS | HEIGHT: 64 IN

## 2024-10-27 DIAGNOSIS — J18.9 PNEUMONIA, UNSPECIFIED ORGANISM: ICD-10-CM

## 2024-10-27 DIAGNOSIS — Z90.49 ACQUIRED ABSENCE OF OTHER SPECIFIED PARTS OF DIGESTIVE TRACT: Chronic | ICD-10-CM

## 2024-10-27 LAB
ALBUMIN SERPL ELPH-MCNC: 4.1 G/DL — SIGNIFICANT CHANGE UP (ref 3.5–5.2)
ALP SERPL-CCNC: 170 U/L — HIGH (ref 30–115)
ALT FLD-CCNC: 31 U/L — SIGNIFICANT CHANGE UP (ref 0–41)
ANION GAP SERPL CALC-SCNC: 14 MMOL/L — SIGNIFICANT CHANGE UP (ref 7–14)
AST SERPL-CCNC: 23 U/L — SIGNIFICANT CHANGE UP (ref 0–41)
BASOPHILS # BLD AUTO: 0 K/UL — SIGNIFICANT CHANGE UP (ref 0–0.2)
BASOPHILS NFR BLD AUTO: 0 % — SIGNIFICANT CHANGE UP (ref 0–1)
BILIRUB SERPL-MCNC: 0.4 MG/DL — SIGNIFICANT CHANGE UP (ref 0.2–1.2)
BUN SERPL-MCNC: 8 MG/DL — LOW (ref 10–20)
CALCIUM SERPL-MCNC: 9.3 MG/DL — SIGNIFICANT CHANGE UP (ref 8.4–10.4)
CHLORIDE SERPL-SCNC: 101 MMOL/L — SIGNIFICANT CHANGE UP (ref 98–110)
CO2 SERPL-SCNC: 25 MMOL/L — SIGNIFICANT CHANGE UP (ref 17–32)
CREAT SERPL-MCNC: 0.6 MG/DL — LOW (ref 0.7–1.5)
EGFR: 123 ML/MIN/1.73M2 — SIGNIFICANT CHANGE UP
EOSINOPHIL # BLD AUTO: 0 K/UL — SIGNIFICANT CHANGE UP (ref 0–0.7)
EOSINOPHIL NFR BLD AUTO: 0 % — SIGNIFICANT CHANGE UP (ref 0–8)
FLUAV AG NPH QL: SIGNIFICANT CHANGE UP
FLUBV AG NPH QL: SIGNIFICANT CHANGE UP
GLUCOSE SERPL-MCNC: 106 MG/DL — HIGH (ref 70–99)
HCG SERPL QL: NEGATIVE — SIGNIFICANT CHANGE UP
HCT VFR BLD CALC: 37.9 % — SIGNIFICANT CHANGE UP (ref 37–47)
HGB BLD-MCNC: 12.9 G/DL — SIGNIFICANT CHANGE UP (ref 12–16)
LYMPHOCYTES # BLD AUTO: 10.6 % — LOW (ref 20.5–51.1)
LYMPHOCYTES # BLD AUTO: 2.03 K/UL — SIGNIFICANT CHANGE UP (ref 1.2–3.4)
MAGNESIUM SERPL-MCNC: 1.9 MG/DL — SIGNIFICANT CHANGE UP (ref 1.8–2.4)
MCHC RBC-ENTMCNC: 27.7 PG — SIGNIFICANT CHANGE UP (ref 27–31)
MCHC RBC-ENTMCNC: 34 G/DL — SIGNIFICANT CHANGE UP (ref 32–37)
MCV RBC AUTO: 81.5 FL — SIGNIFICANT CHANGE UP (ref 81–99)
MONOCYTES # BLD AUTO: 0.94 K/UL — HIGH (ref 0.1–0.6)
MONOCYTES NFR BLD AUTO: 4.9 % — SIGNIFICANT CHANGE UP (ref 1.7–9.3)
NEUTROPHILS # BLD AUTO: 15.72 K/UL — HIGH (ref 1.4–6.5)
NEUTROPHILS NFR BLD AUTO: 82.1 % — HIGH (ref 42.2–75.2)
PLATELET # BLD AUTO: 367 K/UL — SIGNIFICANT CHANGE UP (ref 130–400)
PMV BLD: 9.5 FL — SIGNIFICANT CHANGE UP (ref 7.4–10.4)
POTASSIUM SERPL-MCNC: 3.7 MMOL/L — SIGNIFICANT CHANGE UP (ref 3.5–5)
POTASSIUM SERPL-SCNC: 3.7 MMOL/L — SIGNIFICANT CHANGE UP (ref 3.5–5)
PROT SERPL-MCNC: 7.5 G/DL — SIGNIFICANT CHANGE UP (ref 6–8)
RBC # BLD: 4.65 M/UL — SIGNIFICANT CHANGE UP (ref 4.2–5.4)
RBC # FLD: 13.5 % — SIGNIFICANT CHANGE UP (ref 11.5–14.5)
RSV RNA NPH QL NAA+NON-PROBE: SIGNIFICANT CHANGE UP
SARS-COV-2 RNA SPEC QL NAA+PROBE: SIGNIFICANT CHANGE UP
SODIUM SERPL-SCNC: 140 MMOL/L — SIGNIFICANT CHANGE UP (ref 135–146)
WBC # BLD: 19.15 K/UL — HIGH (ref 4.8–10.8)
WBC # FLD AUTO: 19.15 K/UL — HIGH (ref 4.8–10.8)

## 2024-10-27 PROCEDURE — 36415 COLL VENOUS BLD VENIPUNCTURE: CPT

## 2024-10-27 PROCEDURE — 87641 MR-STAPH DNA AMP PROBE: CPT

## 2024-10-27 PROCEDURE — 71275 CT ANGIOGRAPHY CHEST: CPT | Mod: 26,MC

## 2024-10-27 PROCEDURE — 99285 EMERGENCY DEPT VISIT HI MDM: CPT

## 2024-10-27 PROCEDURE — 87449 NOS EACH ORGANISM AG IA: CPT

## 2024-10-27 PROCEDURE — 87640 STAPH A DNA AMP PROBE: CPT

## 2024-10-27 PROCEDURE — 87899 AGENT NOS ASSAY W/OPTIC: CPT

## 2024-10-27 PROCEDURE — 99223 1ST HOSP IP/OBS HIGH 75: CPT

## 2024-10-27 PROCEDURE — 85025 COMPLETE CBC W/AUTO DIFF WBC: CPT

## 2024-10-27 PROCEDURE — 71046 X-RAY EXAM CHEST 2 VIEWS: CPT | Mod: 26

## 2024-10-27 PROCEDURE — G0378: CPT

## 2024-10-27 PROCEDURE — 80053 COMPREHEN METABOLIC PANEL: CPT

## 2024-10-27 RX ORDER — AZITHROMYCIN DIHYDRATE 200 MG/5ML
500 POWDER, FOR SUSPENSION ORAL EVERY 24 HOURS
Refills: 0 | Status: DISCONTINUED | OUTPATIENT
Start: 2024-10-27 | End: 2024-10-28

## 2024-10-27 RX ORDER — HYDROCHLOROTHIAZIDE 50 MG
1 TABLET ORAL
Refills: 0 | DISCHARGE

## 2024-10-27 RX ORDER — SODIUM CHLORIDE 9 MG/ML
1000 INJECTION, SOLUTION INTRAMUSCULAR; INTRAVENOUS; SUBCUTANEOUS ONCE
Refills: 0 | Status: COMPLETED | OUTPATIENT
Start: 2024-10-27 | End: 2024-10-27

## 2024-10-27 RX ORDER — ACETAMINOPHEN 500 MG
975 TABLET ORAL ONCE
Refills: 0 | Status: COMPLETED | OUTPATIENT
Start: 2024-10-27 | End: 2024-10-27

## 2024-10-27 RX ORDER — ONDANSETRON HYDROCHLORIDE 2 MG/ML
4 INJECTION, SOLUTION INTRAMUSCULAR; INTRAVENOUS ONCE
Refills: 0 | Status: COMPLETED | OUTPATIENT
Start: 2024-10-27 | End: 2024-10-27

## 2024-10-27 RX ORDER — CEFTRIAXONE SODIUM 10 G
1000 VIAL (EA) INJECTION ONCE
Refills: 0 | Status: COMPLETED | OUTPATIENT
Start: 2024-10-27 | End: 2024-10-27

## 2024-10-27 RX ORDER — DIPHENHYDRAMINE HYDROCHLORIDE AND LIDOCAINE HYDROCHLORIDE AND ALUMINUM HYDROXIDE AND MAGNESIUM HYDRO
5 KIT
Refills: 0 | Status: DISCONTINUED | OUTPATIENT
Start: 2024-10-27 | End: 2024-10-28

## 2024-10-27 RX ORDER — LOSARTAN POTASSIUM 25 MG/1
1 TABLET ORAL
Refills: 0 | DISCHARGE

## 2024-10-27 RX ORDER — DEXAMETHASONE 1.5 MG 1.5 MG/1
1 TABLET ORAL ONCE
Refills: 0 | Status: COMPLETED | OUTPATIENT
Start: 2024-10-27 | End: 2024-10-27

## 2024-10-27 RX ORDER — AZITHROMYCIN DIHYDRATE 200 MG/5ML
500 POWDER, FOR SUSPENSION ORAL ONCE
Refills: 0 | Status: COMPLETED | OUTPATIENT
Start: 2024-10-27 | End: 2024-10-27

## 2024-10-27 RX ORDER — KETOROLAC TROMETHAMINE 30 MG/ML
15 INJECTION INTRAMUSCULAR; INTRAVENOUS EVERY 8 HOURS
Refills: 0 | Status: DISCONTINUED | OUTPATIENT
Start: 2024-10-27 | End: 2024-10-28

## 2024-10-27 RX ORDER — CEFTRIAXONE SODIUM 10 G
1000 VIAL (EA) INJECTION EVERY 24 HOURS
Refills: 0 | Status: DISCONTINUED | OUTPATIENT
Start: 2024-10-27 | End: 2024-10-28

## 2024-10-27 RX ORDER — LOSARTAN POTASSIUM 25 MG/1
100 TABLET ORAL DAILY
Refills: 0 | Status: DISCONTINUED | OUTPATIENT
Start: 2024-10-27 | End: 2024-10-28

## 2024-10-27 RX ORDER — ACETAMINOPHEN 500 MG
650 TABLET ORAL EVERY 6 HOURS
Refills: 0 | Status: DISCONTINUED | OUTPATIENT
Start: 2024-10-27 | End: 2024-10-28

## 2024-10-27 RX ADMIN — SODIUM CHLORIDE 2000 MILLILITER(S): 9 INJECTION, SOLUTION INTRAMUSCULAR; INTRAVENOUS; SUBCUTANEOUS at 17:55

## 2024-10-27 RX ADMIN — ONDANSETRON HYDROCHLORIDE 4 MILLIGRAM(S): 2 INJECTION, SOLUTION INTRAMUSCULAR; INTRAVENOUS at 17:56

## 2024-10-27 RX ADMIN — Medication 975 MILLIGRAM(S): at 17:55

## 2024-10-27 RX ADMIN — Medication 100 MILLIGRAM(S): at 19:13

## 2024-10-27 RX ADMIN — AZITHROMYCIN DIHYDRATE 255 MILLIGRAM(S): 200 POWDER, FOR SUSPENSION ORAL at 20:12

## 2024-10-27 RX ADMIN — DEXAMETHASONE 1.5 MG 1 MILLIGRAM(S): 1.5 TABLET ORAL at 17:55

## 2024-10-27 NOTE — ED PROVIDER NOTE - OBJECTIVE STATEMENT
29-year-old female history of HTN presents for evaluation of persistent fevers through taking antibiotics for strep throat which was diagnosed 2 days ago.  Patient started having fevers Thursday night with sore throat which has improved and cough is persisting.  Patient denies shortness of breath, but endorses present fevers of 104F.  She has been taking thousand Mg Tylenol every 4 hours alternating with 400 Mg Motrin.  Patient has been sweating and feeling episodes of emesis, but denies abdominal pain diarrhea, rashes or urinary symptoms.

## 2024-10-27 NOTE — ED PROVIDER NOTE - PHYSICAL EXAMINATION
CONSTITUTIONAL: well-appearing, well nourished, non-toxic, NAD  HEAD: NCAT  EYES: EOMI, PERRLA, no scleral icterus  ENT: Moist mucous membranes, b/l tonsillar exudates L>R  NECK: non tender. Full ROM. +non tender L submandibular lymph nodes, +non tender b/l cervical LAD  CARD: RRR  RESP: left posterior basilar crackles  ABD: soft, non-tender, non-distended, No CVA tenderness  EXT: Full ROM  NEURO: normal motor. normal sensory. Normal gait.  PSYCH: Cooperative, appropriate.

## 2024-10-27 NOTE — H&P ADULT - HISTORY OF PRESENT ILLNESS
29-year-old female history of HTN presents for evaluation of persistent fevers through taking antibiotics for strep throat which was diagnosed 2 days ago.  Patient started having fevers Thursday night with sore throat which has improved and cough is persisting.  Patient denies shortness of breath, but endorses present fevers of 104F.  She has been taking thousand Mg Tylenol every 4 hours alternating with 400 Mg Motrin.  Patient has been sweating and feeling episodes of emesis, but denies abdominal pain diarrhea, rashes or urinary symptoms.    in the ED vitals   on admission     · BP Systolic   178 mm Hg/135 mm Hg  · Heart Rate:81 /min  · Temp (C)  37.3 Degrees C oral   · SpO2 (%)  98 % on room air

## 2024-10-27 NOTE — H&P ADULT - ATTENDING COMMENTS
Patient seen and examined at bedside independently of the residents. I read the resident's note and agree with the plan with the additions and corrections as noted below. My note supersedes the resident's note.     REVIEW OF SYSTEMS:  Negative except in HPI.     PMH: HTN    FHx: Reviewed. No fhx of asthma/copd, No fhx of liver and pulmonary disease. No fhx of hematological disorder.     Physical Exam:  GEN: No acute distress. Awake, Alert and oriented x 3.   Head: Atraumatic, Normocephalic.   Eye: PEERLA. No sclera icterus. EOMI.   ENT: Normal oropharynx, no thyromegaly, no mass, no lymphadenopathy.   LUNGS: Clear to auscultation bilaterally. No wheeze/rales/crackles.   HEART: Normal. S1/S2 present. RRR. No murmur/gallops.   ABD: Soft, non-tender, non-distended. Bowel sounds present.   EXT: No pitting edema. No erythema. No tenderness.  Integumentary: No rash, No sore, No petechia.   NEURO: CN III-XII intact. Strength: 5/5 b/l ULE. Sensory intact b/l ULE.     Vital Signs Last 24 Hrs  T(C): 36.7 (27 Oct 2024 22:53), Max: 37.3 (27 Oct 2024 16:58)  T(F): 98.1 (27 Oct 2024 22:53), Max: 99.1 (27 Oct 2024 16:58)  HR: 107 (27 Oct 2024 22:53) (81 - 107)  BP: 134/82 (27 Oct 2024 22:53) (134/82 - 178/135)  BP(mean): --  RR: 18 (27 Oct 2024 22:53) (18 - 18)  SpO2: 97% (27 Oct 2024 22:53) (97% - 98%)    Parameters below as of 27 Oct 2024 22:53  Patient On (Oxygen Delivery Method): room air      Please see the above notes for Labs and radiology.     Assessment and Plan:     30 yo F with hx of HTN presents to ED for fever, cough and sore throat x 3 days.     Fever and Cough 2/2 CAP   - CTA Chest: Right upper and left lower lobe consolidations, most consistent with pneumonia.   - Azithromycin and Ceftriaxone   - f/u BCx, MRSA nares, Atypical PNA panel  - supportive care.     HTN - c/w home med.     DVT ppx: encourage ambulation  GI ppx: not indicated.   Diet: NPO with IVF gentle hydration  Activity: as tolerated.     Date seen by the attending: 10/27/2024  I have spent 75 minutes of time on the encounter which excludes teaching and/or separately reported services.

## 2024-10-27 NOTE — ED ADULT NURSE NOTE - CAS TRG GENERAL AIRWAY, MLM
How Severe Are Your Spot(S)?: moderate
What Type Of Note Output Would You Prefer (Optional)?: Standard Output
What Is The Reason For Today's Visit?: Full Body Skin Examination
What Is The Reason For Today's Visit? (Being Monitored For X): concerning skin lesions on an annual basis
Patent

## 2024-10-27 NOTE — H&P ADULT - NSHPLABSRESULTS_GEN_ALL_CORE
12.9   19.15 )-----------( 367      ( 27 Oct 2024 18:06 )             37.9     10-27    140  |  101  |  8[L]  ----------------------------<  106[H]  3.7   |  25  |  0.6[L]    Ca    9.3      27 Oct 2024 18:06  Mg     1.9     10-27    TPro  7.5  /  Alb  4.1  /  TBili  0.4  /  DBili  x   /  AST  23  /  ALT  31  /  AlkPhos  170[H]  10-27          LIVER FUNCTIONS - ( 27 Oct 2024 18:06 )  Alb: 4.1 g/dL / Pro: 7.5 g/dL / ALK PHOS: 170 U/L / ALT: 31 U/L / AST: 23 U/L / GGT: x           Urinalysis Basic - ( 27 Oct 2024 18:06 )    Color: x / Appearance: x / SG: x / pH: x  Gluc: 106 mg/dL / Ketone: x  / Bili: x / Urobili: x   Blood: x / Protein: x / Nitrite: x   Leuk Esterase: x / RBC: x / WBC x   Sq Epi: x / Non Sq Epi: x / Bacteria: x    Ct angio chest : Right upper and left lower lobe consolidations, most consistent with pneumonia. Recommend radiographic follow-up until resolution

## 2024-10-27 NOTE — ED PROVIDER NOTE - CLINICAL SUMMARY MEDICAL DECISION MAKING FREE TEXT BOX
Patient endorsed to me by Dr. Vivar pending CT angio and admission.   30 yo F w/ hx of HTN p/w cough, sob, fever, sore throat. has been on amoxicillin for 3 days with no improvement and now has started vomiting. labs with leukocytosis. xray noted with multifocal opacities. CT angio with no PE, multifocal pneumonia on CT. given iv abx, admitted for further management.

## 2024-10-27 NOTE — ED PROVIDER NOTE - ATTENDING CONTRIBUTION TO CARE
29-year-old female past medical history hypertension presents with fever cough congestion sore throat since Thursday.  Seen in urgent care and tested positive for strep throat on Friday, started on amoxicillin.  Since today has vomited 3 times nonbilious nonbloody.  No diarrhea.  No abdominal pain.  No dysuria frequency hematuria.  No chest pain shortness of breath.  Forgot to take her blood pressure medicine today.      on exam, AFVSS, well robin nad, ncat, eomi, perrla, mmm, Bilateral tonsillar erythema edema and exudates, normal speech no drooling, uvula midline, Bilateral crackles, no respiratory distress or hypoxia, speaking full sentences, rrr nl s1s2 no mrg, abd soft ntnd, aaox3, no focal deficits, no le edema or calf ttp,     a/p; Fever cough URI symptoms strep throat, vomiting x 1 day, will do labs chest x-ray IV fluids antiemetics Decadron reeval

## 2024-10-27 NOTE — H&P ADULT - NSHPPHYSICALEXAM_GEN_ALL_CORE
PHYSICAL EXAM:      Constitutional:    Eyes:    ENMT:    Neck:    Breasts:    Back:    Respiratory:    Cardiovascular:    Gastrointestinal:    Genitourinary:    Rectal:    Extremities:    Vascular:    Neurological:    Skin:    Lymph Nodes:    Musculoskeletal:    Psychiatric: PHYSICAL EXAM:      Constitutional:not in acute distress clinically and HD stable     ENMT:Moist mucous membranes , swollen tonsills covered with pus ( no sign of kissing tonsils or critical airway narrowing )     Neck:Supple neck     Back:No point tenderness in the back     Respiratory:GBAE , minimal rhonchi on the right base     Cardiovascular:Regualr s1s2     Gastrointestinal:Soft non tender non distended abdomen     Extremities:No lower limb edema     Neurological:No focal motor deficit     Musculoskeletal:Motor power 5/5 in all 4 extremities

## 2024-10-27 NOTE — ED PROVIDER NOTE - IV ALTEPLASE EXCL REL HIDDEN
Patient:   Helder Sharp            MRN: [id number]            FIN: [id number]               Age:   76 years     Sex:  Male     :  1942   Associated Diagnoses:   COPD exacerbation   Author:   Thania Mota MD      Basic Information   Time seen: Date & time 2018 12:03:00.   History source: Patient.   Arrival mode: Private vehicle.   History limitation: None.   Additional information: Patient's physician(s): Orlando TORRES, Tony ZALDIVAR      History of Present Illness   The patient presents with difficulty breathing, 75y/o M presents to the ED with SOB associated with generalize body aches and chilies. Seen at  this AM and directed here for further evaluation due to  O2 noted of  87-89 % RA. Hx of COPD baseline O2 at home 92% RA. Duncan louise and     I, Dr. Mota, assumed care of this patient at 14:22.  77 yo male presents with fever and chills today. Pt states he awoke today at 3:00 AM with chills and a headache. He began having fever, SOB, and body aches at 7:00 AM. He took Ibuprofen which, according to his wife, relieved his fever, but which the Pt says did not relieve his body aches or chills. He did not eat breakfast because he felt poorly. Pt reports he then went to [facility], but was told to report to the ED due to his low oxygen level. His SOB has improved with oxygen here in the ED. Pt states his headache feels like those he has had in the past when he is hungry or dehydrated. He admits to cough (which has not increased from his baseline), and mild sinus pressure. He denies sputum production, photophobia, or focal numbness/weakness. He has not been around any sick contacts and has not been on antibiotics recently apart from hoda-operative abx 1 month ago for an appendectomy. Of note, Pt has a Hx of COPD for which he takes Breo Ellipta daily and Combivent PRN. He is a former smoker..  The onset was today.  The course/duration of symptoms is improving.  Degree at onset moderate.   Degree at present mild.  The Exacerbating factors is none.  The Relieving factors is oxygen.  Risk factors consist of chronic obstructive pulmonary disease and smoking.  Prior episodes: none.  Therapy today: oxygen.  Associated symptoms: fever, chills, headache, mild sinus pressure, cough unchanged and denies sputum production, photophobia, or unilateral weakness.  Additional history: none.        Review of Systems   Constitutional symptoms:  Fever, No chills,    Skin symptoms:  No rash,    Eye symptoms:  No recent vision problems, Reports: denies photophobia.   ENMT symptoms:  Sinus pain, No sore throat,    Respiratory symptoms:  Shortness of breath.   Cardiovascular symptoms:  No chest pain, no syncope.    Gastrointestinal symptoms:  No abdominal pain, no nausea, no vomiting, no diarrhea, no constipation.    Genitourinary symptoms:  No dysuria,    Musculoskeletal symptoms:  No back pain,    Neurologic symptoms:  Headache, denies unilateral weakness.       Health Status   Allergies:    Allergic Reactions (Selected)  Severity Not Documented  Flexeril- Unknown..   Medications:  (Selected)   Prescriptions  Prescribed  atorvastatin 20 mg oral tablet: 20 mg = 1 tab(s), Oral, Daily, # 90 tab(s), 3 Refill(s), [facility]acy: [id number]  sertraline 100 mg oral tablet: See Instructions, TAKE 1 TABLET BY MOUTH DAILY, # 90 tab(s), 3 Refill(s), Pharmacy: [id number]  Documented Medications  Documented  Breo Ellipta 100 mcg-25 mcg/inh inhalation powder: 1 puff, Daily, 996 Refill(s)  COMBIVENT    AER : 1puff, INH, QID, PRN PRN shortness of breath or wheezing  DuoNeb: 3 mL, NEB, q4hr Resp, PRN PRN shortness of breath or wheezing, 0 Refill(s)  Influenza Virus Vaccine, Inactivated: IM, Daily, 0 Refill(s)  aspirin 81 mg oral tablet: 81 mg = 1 tab(s), Oral, Daily, 0 Refill(s).   Immunizations: Include Immunizations   Previous  influenza virus vaccine, inactivated: Ad hoc dose () 11/8/2017 CST, Ad hoc dose (Refuse - Do  Not Give; Patient Refuses) 11/8/2018 CST.  pneumococcal 13-valent conjugate vaccine: Ad hoc dose () 9/29/2014 CDT.  pneumococcal 23-polyvalent vaccine: Ad hoc dose () 2/12/2007 CST..      Past Medical/ Family/ Social History   Medical history:    Active  COPD (chronic obstructive pulmonary disease) ([id number])  Resolved  Sinus infection ([id number]):  Resolved.  Pneumonia ([id number]):  Resolved.  Comments:  2/13/2012 CST 12:33 CST - Ban Farmer LPN  about a year ago  Emphysema ([id number]):  Resolved.  Comments:  2/13/2012 CST 12:34 Ban Gomez LPN  slight , sees Dr Knox yearly no inhalers or neb treatments  Colon polyps ([id number]):  Resolved.  Comments:  2/13/2012 CST 12:34 CST Ban Meehan LPN  removed  Inguinal hernia ([id number]):  Resolved.  Comments:  2/13/2012 CST 12:35 Ban Gomez LPN.  right.   Surgical history:    Appendectomy Laparoscopic (None) on 11/7/2018 at 76 Years.  Comments:  11/7/2018 13:44 - Mee Mcwilliams RN  auto-populated from documented surgical case  Left Heart Cath w/COR Angio Ventriculography (None) on 1/28/2016 at 73 Years.  Comments:  1/28/2016 07:30 - Abdulkadir WARREN, Ruddy  auto-populated from documented surgical case  Vasectomy ([id number]).  Tonsillectomy ([id number]).  Colonoscopy ([id number]).  Hernia repair ([id number])..   Family history: Not significant.   Social history:    Social & Psychosocial Habits    Alcohol  02/13/2012 Risk Assessment: Denies Alcohol Use    12/16/2018  Use: Past    Employment/School  02/24/2018  Status: Retired    12/16/2018  Status: Employed    Previous employment/school: [employer]    Home/Environment  02/24/2018  Lives with: Spouse    Tobacco  02/13/2012  Use: Past    Comment: quit 3yrs ago - 02/13/2012 12:37 - Ban Farmer LPN    10/29/2018  Use: Former smoker     Patient Wants Consult For Cessation Counseling No    11/07/2018  Use: Never smoker    Patient Wants Consult For Cessation Counseling N/A    12/16/2018  Use: Former smoker    Type: Cigarettes    Patient Wants Consult For Cessation Counseling No    Started at age: 14 Years    Stopped at age: 66 Years    Concerns about tobacco use in household: No, Tobacco use: former smoker.      Physical Examination               Vital Signs   Vital Signs   12/16/2018 12:01 CST     Temperature Oral          36.9 DegC                             Temperature Oral (calculated)             98.42 DegF                             Peripheral Pulse Rate     93 bpm                             Respiratory Rate          18 br/min                             SpO2                      88 %  LOW                             Oxygen Therapy            Room air                             Systolic Blood Pressure   132 mmHg                             Diastolic Blood Pressure  60 mmHg  .   Measurements   12/16/2018 12:01 CST     Weight Dosing             101 kg                             Weight Measured and Calculated in Lbs     222.66 lb                             Weight Estimated          101 kg                             Height/Length Dosing      188 cm                             Height/Length Estimated   188 cm                             Body Mass Index Estimated 28.58 kg/m2  .   Basic Oxygen Information   12/16/2018 12:01 CST     SpO2                      88 %  LOW                             Oxygen Therapy            Room air  .   General:  Alert, no acute distress.    Skin:  Warm, dry, pink, intact, no pallor, no rash, normal for ethnicity.    Head:  Normocephalic, atraumatic.    Neck:  Supple, trachea midline, No meningismus.    Eye:  Pupils are equal, round and reactive to light, extraocular movements are intact, normal conjunctiva.    Ears, nose, mouth and throat:  Mouth: Dry mucous membranes, lips dry.   Cardiovascular:  Regular rate  and rhythm, Normal peripheral perfusion.    Respiratory:  Respirations are non-labored, breath sounds are equal, Symmetrical chest wall expansion, Breath sounds: Bilateral, diminished, wheezes present (expiratory wheezes, coarse), diffuse.    Gastrointestinal:  Soft, Nontender, Non distended, Normal bowel sounds.    Back:  Normal range of motion.   Musculoskeletal:  Normal ROM, No calf edema, asymmetry, erythema, warmth, tenderness to palpation or palpable cords appreciated bilaterally.    Neurological:  Alert and oriented to person, place, time, and situation, No focal neurological deficit observed, CN II-XII intact, normal sensory observed, normal motor observed, normal speech observed.    Psychiatric:  Cooperative.      Medical Decision Making   Differential Diagnosis:  Pneumonia, bronchitis, congestive heart failure, chronic obstructive pulmonary disease, pulmonary edema, pleural effusion, acute myocardial infarction, upper respiratory infection, influenza.    Rationale:  76-year-old male with history of COPD presents for evaluation of fever, shortness of breath and reported hypoxia on room air.  He is not on home oxygen.  He is currently normoxic on a nasal cannula without complaints.  Afebrile and hemodynamically stable.  No evidence of DVT.  Abdomen is soft and nonsurgical.  No reported chest pain.  EKG sinus tachycardia without acute ischemia.  Labs obtained at triage are significant for leukocytosis, negative troponin, BNP, and chest x-ray.  Awaiting urinalysis.  A dose of Levaquin will be given, blood cultures and lactic acid will be added.  He has received IV fluids.  He is asking to eat.  Hospital medicine will be consulted for admission.   Documents reviewed:  Emergency department nurses' notes.   Orders  Launch Orders   Laboratory:  UA Total a reflex to culture (Order): Stat collect, Urine, 12/16/2018 12:06 CST, Nurse collect, Print Label By Order Location  Influenza A&B Antigen (Order): Stat collect,  Swab, 12/16/2018 12:06 CST, Nurse collect, Print Label By Order Location, 12/16/2018 12:06 CST  Troponin-I (Order): Stat collect, 12/16/2018 12:06 CST, Blood, Lab Collect, Print Label By Order Location, 12/16/2018 12:06 CST  POC BNP iSTAT request (Order): Blood, Routine collect, 12/16/2018 12:06 CST by Lula HORTA-C, Kellen YBARRA, Lab Collect, Print Label By Order Location  CMP (Order): Stat collect, 12/16/2018 12:06 CST, Blood, Lab Collect, Print Label By Order Location, 12/16/2018 12:06 CST  CBC w/ Auto Diff (Order): Now collect, 12/16/2018 12:06 CST, Blood, Lab Collect, Print Label By Order Location, 12/16/2018 12:06 CST  POC iSTAT ER Troponin request (Order): Blood, Stat collect, 12/16/2018 12:06 CST by Lula FNP-C, Kellen YBARRA, Lab Collect, Print Label By Order Location  Radiology:  XR Chest 2 Views (Order): Stat, 12/16/2018 12:06 CST, Chest Pain, None, Wheelchair, Patient Has IV?, Patient on Oxygen?, Rad Type, Not Scheduled  Cardiology:  EKG Adult 12 Lead (Order): 12/16/2018 12:06 CST, Stat, Once, 12/16/2018 12:06 CST, Wheelchair, Patient Has IV, Patient on O2, Standard Precautions, -1, -1, 12/16/2018 12:06 CST.   Electrocardiogram:  Time 12/16/2018 12:55:00, rate 103, normal sinus rhythm, no ectopy, QRS interval Widened 132 ms, right bundle branch block, Previous EKG available None available, Interpretation by Emergency Physician No ST elevation.    Results review:  Lab results : Lab View   12/16/2018 18:02 CST     UA Appear                 CLEAR                             UA Color                  YELLOW                             UA Spec Grav              1.019                             UA Bili                   Negative                             UA pH                     5.0                             UA Urobilinogen           0.2                             UA Blood                  Negative                             UA Glucose                Negative                             UA Ketones                 Negative                             UA Protein                Negative                             UA Nitrite                Negative                             UA Leuk Est               Negative                             UA WBC                    NONE SEEN /HPF                             UA RBC                    NONE SEEN                             UA Bacteria               NONE SEEN /HPF                             UA Squam Epithelial       NONE SEEN    12/16/2018 16:25 CST     Lactic Acid Lvl           2.5 mmol/L  CRIT    12/16/2018 13:11 CST     POC BNP iSTAT             59 pg/mL                             POC Troponin              0.00 ng/mL    12/16/2018 13:10 CST     Influ A Ag                Negative                             Influ B Ag                Negative    12/16/2018 13:03 CST     Sodium Lvl                139 mmol/L                             Potassium Lvl             4.1 mmol/L                             Chloride                  108 mmol/L  HI                             CO2                       21.0 mmol/L                             Calcium Lvl               8.9 mg/dL                             Glucose Lvl               133 mg/dL  HI                             BUN                       26.0 mg/dL  HI                             Creatinine                1.39 mg/dL  HI                             eGFR-AA                   >60 mL/min/1.73 m2  NA                             eGFR-MILY                  53 mL/min/1.73 m2  NA                             Bili Total                1.0 mg/dL                             Bili Direct               0.30 mg/dL                             Bili Indirect             0.70 mg/dL                             AST                       17 unit/L                             ALT                       25 unit/L                             Alk Phos                  87 unit/L                             Total Protein             7.7 gm/dL                              Albumin Lvl               4.10 gm/dL                             Globulin                  3.60 gm/dL  HI                             A/G Ratio                 1.1 ratio                             Troponin-I                <0.02 ng/mL                             WBC                       19.1 x10(3)/mcL  HI                             RBC                       5.01 x10(6)/mcL                             Hgb                       15.0 gm/dL                             Hct                       47.5 %                             Platelet                  169 x10(3)/mcL                             MCV                       94.8 fL  HI                             MCH                       29.9 pg                             MCHC                      31.6 gm/dL  LOW                             RDW                       13.7 %                             MPV                       9.3 fL  LOW                             Abs Neut                  16.33 x10(3)/mcL  HI                             Neutro Auto               86 %  NA                             Lymph Auto                8 %  NA                             Mono Auto                 6 %  NA                             Eos Auto                  0 %  NA                             Abs Eos                   0.0 x10(3)/mcL                             Basophil Auto             0 %  NA                             Abs Neutro                16.33 x10(3)/mcL  HI                             Abs Lymph                 1.5 x10(3)/mcL                             Abs Mono                  1.1 x10(3)/mcL                             Abs Baso                  0.0 x10(3)/mcL  .   Chest X-Ray:  No acute disease process, interpretation by Emergency Physician.    Radiology results:  Rad Results (ST)  < 12 hrs   Accession: [id number]  Order: XR Chest 2 Views  Report Dt/Tm: 12/16/2018 12:32  Report:      XR Chest 2 Views     REASON FOR STUDY: Chest Pain     Comparison:  11/7/2018     FINDINGS:     Lungs appear clear of active disease. There is no pleural effusion or  pneumothorax identified. There are small granulomas in the left upper  lobe. Heart size is normal. Mediastinum is not widened. No evidence of  pleural effusion.     Impression:     No acute cardiopulmonary findings.            .      Reexamination/ Reevaluation   Time: 12/16/2018 16:40:00 .   Course: progressing as expected.   Notes: Patient now reports abdominal discomfort in the right lower quadrant.  Abdomen remains soft without rebound or guarding.  Mild discomfort on exam.  Considering his white count with fever and no obvious source, CT of the abdomen will be obtained to assess considering recent surgery.  Hospital medicine is aware.      Impression and Plan   Diagnosis   COPD exacerbation ([id number] J44.1)   Leukocytosis      Calls-Consults   -  12/16/2018 15:05:00 , Hospital medicine.    Plan   Condition: Stable.    Disposition: Admit time  12/16/2018 15:05:00, Place in Observation Telemetry Unit, Tyson TORRES, Kali PRADO    Counseled: Patient, Family, Regarding diagnosis, Regarding diagnostic results, Regarding treatment plan, Patient indicated understanding of instructions, Family understood.    Notes: I, Ben Easton, acted solely as a scribe for and in the presence of Dr. Mota who performed the service., I, Dr. Thania Mota, personally evaluated and examined this patient and agree with the documentation as above. .     show

## 2024-10-27 NOTE — H&P ADULT - ASSESSMENT
29-year-old female history of HTN presents for evaluation of persistent fevers through taking antibiotics for strep throat which was diagnosed 2 days ago.   Clinically and hemodynamically stable   Admitted for strep throat and bilateral pneumonia   CURB-65 : 0 , PSI : 21 , risk of mortality 0.1%     #sore throat   had a positive strep result on friday   had also a strep isolate positive in june   HAs contact with her kids who get frequently sick but no recent sick contacts   was started on augmentin as OP , will stop and start on ceftriaxone as Inpatient   Will start patient on magic mouthwash BID     #bilateral pneumonia   #leukocytosis   Bilateral pneumonia on ct chest   Blood cultures taken in ED , s/p 1L bolus in ED   s/p ceftraixone and azithromycin in the ED   Will add azithromycin 500 mg daily for a total of 3 days ( CAP coverage 3rd gen cephalo and macrolide)  will order MRSa nares , legionella and strep Ag in urine   RVP negative   fever control with tylenol and ketorlac         #HTN   resume home dose of losartan 100 mg daily and HCTZ 12.5 mg daily         MEDREC done with patient   DASH diet

## 2024-10-27 NOTE — ED PROVIDER NOTE - CONSIDERATION OF ADMISSION OBSERVATION
admission    Labs  were ordered and reviewed.  Imaging was ordered and reviewed by me.  Appropriate medications for patient's presenting complaints were ordered and effects were reassessed.  Patient's records (prior hospital, ED visit, and/or nursing home notes if available) were reviewed.  Additional history was obtained from EMS, family, and/or PCP (where available).  Escalation to admission/observation was considered.  Patient requires inpatient hospitalization - monitored setting. Consideration of Admission/Observation

## 2024-10-27 NOTE — ED ADULT NURSE NOTE - NSFALLUNIVINTERV_ED_ALL_ED
Bed/Stretcher in lowest position, wheels locked, appropriate side rails in place/Call bell, personal items and telephone in reach/Instruct patient to call for assistance before getting out of bed/chair/stretcher/Non-slip footwear applied when patient is off stretcher/[place] to call system/Physically safe environment - no spills, clutter or unnecessary equipment/Purposeful proactive rounding/Room/bathroom lighting operational, light cord in reach

## 2024-10-28 VITALS
SYSTOLIC BLOOD PRESSURE: 121 MMHG | RESPIRATION RATE: 18 BRPM | OXYGEN SATURATION: 96 % | HEART RATE: 104 BPM | DIASTOLIC BLOOD PRESSURE: 88 MMHG | TEMPERATURE: 98 F

## 2024-10-28 PROBLEM — G43.909 MIGRAINE, UNSPECIFIED, NOT INTRACTABLE, WITHOUT STATUS MIGRAINOSUS: Chronic | Status: ACTIVE | Noted: 2019-08-08

## 2024-10-28 LAB
ALBUMIN SERPL ELPH-MCNC: 3.9 G/DL — SIGNIFICANT CHANGE UP (ref 3.5–5.2)
ALP SERPL-CCNC: 161 U/L — HIGH (ref 30–115)
ALT FLD-CCNC: 30 U/L — SIGNIFICANT CHANGE UP (ref 0–41)
ANION GAP SERPL CALC-SCNC: 16 MMOL/L — HIGH (ref 7–14)
AST SERPL-CCNC: 30 U/L — SIGNIFICANT CHANGE UP (ref 0–41)
BASOPHILS # BLD AUTO: 0.03 K/UL — SIGNIFICANT CHANGE UP (ref 0–0.2)
BASOPHILS NFR BLD AUTO: 0.2 % — SIGNIFICANT CHANGE UP (ref 0–1)
BILIRUB SERPL-MCNC: 0.2 MG/DL — SIGNIFICANT CHANGE UP (ref 0.2–1.2)
BUN SERPL-MCNC: 8 MG/DL — LOW (ref 10–20)
CALCIUM SERPL-MCNC: 9.1 MG/DL — SIGNIFICANT CHANGE UP (ref 8.4–10.5)
CHLORIDE SERPL-SCNC: 101 MMOL/L — SIGNIFICANT CHANGE UP (ref 98–110)
CO2 SERPL-SCNC: 22 MMOL/L — SIGNIFICANT CHANGE UP (ref 17–32)
CREAT SERPL-MCNC: 0.6 MG/DL — LOW (ref 0.7–1.5)
EGFR: 123 ML/MIN/1.73M2 — SIGNIFICANT CHANGE UP
EOSINOPHIL # BLD AUTO: 0.02 K/UL — SIGNIFICANT CHANGE UP (ref 0–0.7)
EOSINOPHIL NFR BLD AUTO: 0.1 % — SIGNIFICANT CHANGE UP (ref 0–8)
GLUCOSE SERPL-MCNC: 97 MG/DL — SIGNIFICANT CHANGE UP (ref 70–99)
HCT VFR BLD CALC: 36.2 % — LOW (ref 37–47)
HGB BLD-MCNC: 12.2 G/DL — SIGNIFICANT CHANGE UP (ref 12–16)
IMM GRANULOCYTES NFR BLD AUTO: 0.4 % — HIGH (ref 0.1–0.3)
LYMPHOCYTES # BLD AUTO: 16.6 % — LOW (ref 20.5–51.1)
LYMPHOCYTES # BLD AUTO: 2.23 K/UL — SIGNIFICANT CHANGE UP (ref 1.2–3.4)
MCHC RBC-ENTMCNC: 27.6 PG — SIGNIFICANT CHANGE UP (ref 27–31)
MCHC RBC-ENTMCNC: 33.7 G/DL — SIGNIFICANT CHANGE UP (ref 32–37)
MCV RBC AUTO: 81.9 FL — SIGNIFICANT CHANGE UP (ref 81–99)
MONOCYTES # BLD AUTO: 0.89 K/UL — HIGH (ref 0.1–0.6)
MONOCYTES NFR BLD AUTO: 6.6 % — SIGNIFICANT CHANGE UP (ref 1.7–9.3)
MRSA PCR RESULT.: NEGATIVE — SIGNIFICANT CHANGE UP
NEUTROPHILS # BLD AUTO: 10.19 K/UL — HIGH (ref 1.4–6.5)
NEUTROPHILS NFR BLD AUTO: 76.1 % — HIGH (ref 42.2–75.2)
NRBC # BLD: 0 /100 WBCS — SIGNIFICANT CHANGE UP (ref 0–0)
PLATELET # BLD AUTO: 288 K/UL — SIGNIFICANT CHANGE UP (ref 130–400)
PMV BLD: 10.8 FL — HIGH (ref 7.4–10.4)
POTASSIUM SERPL-MCNC: 5 MMOL/L — SIGNIFICANT CHANGE UP (ref 3.5–5)
POTASSIUM SERPL-SCNC: 5 MMOL/L — SIGNIFICANT CHANGE UP (ref 3.5–5)
PROT SERPL-MCNC: 7.5 G/DL — SIGNIFICANT CHANGE UP (ref 6–8)
RBC # BLD: 4.42 M/UL — SIGNIFICANT CHANGE UP (ref 4.2–5.4)
RBC # FLD: 13.8 % — SIGNIFICANT CHANGE UP (ref 11.5–14.5)
SODIUM SERPL-SCNC: 139 MMOL/L — SIGNIFICANT CHANGE UP (ref 135–146)
WBC # BLD: 13.42 K/UL — HIGH (ref 4.8–10.8)
WBC # FLD AUTO: 13.42 K/UL — HIGH (ref 4.8–10.8)

## 2024-10-28 PROCEDURE — 99239 HOSP IP/OBS DSCHRG MGMT >30: CPT

## 2024-10-28 RX ORDER — INFLUENZ VIR VAC TV P-SURF2003 15MCG/.5ML
0.5 SYRINGE (ML) INTRAMUSCULAR ONCE
Refills: 0 | Status: DISCONTINUED | OUTPATIENT
Start: 2024-10-28 | End: 2024-10-28

## 2024-10-28 RX ADMIN — AZITHROMYCIN DIHYDRATE 255 MILLIGRAM(S): 200 POWDER, FOR SUSPENSION ORAL at 12:17

## 2024-10-28 RX ADMIN — LOSARTAN POTASSIUM 100 MILLIGRAM(S): 25 TABLET ORAL at 05:33

## 2024-10-28 RX ADMIN — Medication 100 MILLIGRAM(S): at 13:41

## 2024-10-28 RX ADMIN — DIPHENHYDRAMINE HYDROCHLORIDE AND LIDOCAINE HYDROCHLORIDE AND ALUMINUM HYDROXIDE AND MAGNESIUM HYDRO 5 MILLILITER(S): KIT at 05:34

## 2024-10-28 NOTE — DISCHARGE NOTE NURSING/CASE MANAGEMENT/SOCIAL WORK - PATIENT PORTAL LINK FT
You can access the FollowMyHealth Patient Portal offered by [facility] by registering at the following website: [email or web address]. By joining [facility]’s FollowMyHealth portal, you will also be able to view your health information using other applications (apps) compatible with our system.

## 2024-10-28 NOTE — DISCHARGE NOTE PROVIDER - HOSPITAL COURSE
Hospital summary: A 29-year-old female with a history of hypertension presented to the ED with persistent fevers, cough, and sore throat despite two days of antibiotics for strep throat diagnosed two days prior.  She reported fevers up to 104°F, sweating, and emesis, but denied shortness of breath or other systemic symptoms.  On exam, she had purulent tonsillitis and minimal rhonchi.  A CT angiogram revealed right upper and left lower lobe consolidations consistent with bilateral pneumonia.  She was admitted for treatment of bilateral pneumonia and strep throat. Patient was not septic on admission but met SIRS criteria after her admission. She received treatment with ceftriaxone and azithromycin for 1 day. She showed significant clinical improvement after 1 night and was discharged with oral antibiotics to take at home.     HPI:  29-year-old female history of HTN presents for evaluation of persistent fevers through taking antibiotics for strep throat which was diagnosed 2 days ago.   Clinically and hemodynamically stable   Admitted for strep throat and bilateral pneumonia   CURB-65 : 0 , PSI : 21 , risk of mortality 0.1%     #sore throat   had a positive strep result on friday   had also a strep isolate positive in june   HAs contact with her kids who get frequently sick but no recent sick contacts   was started on augmentin as OP , will stop and start on ceftriaxone as Inpatient   DC with     #bilateral pneumonia   #leukocytosis   Bilateral pneumonia on ct chest   Blood cultures taken in ED , s/p 1L bolus in ED   s/p ceftraixone and azithromycin in the ED   will order MRSa nares , legionella and strep Ag in urine   RVP negative   fever control with tylenol and ketorlac   Blood cultures  Will add azithromycin 500 mg daily for a total of 3 days ( CAP coverage 3rd gen cephalo and macrolide)      #HTN   resume home dose of losartan 100 mg daily and HCTZ 12.5 mg daily    Hospital summary: A 29-year-old female with a history of hypertension presented to the ED with persistent fevers, cough, and sore throat despite two days of antibiotics for strep throat diagnosed two days prior.  She reported fevers up to 104°F, sweating, and emesis, but denied shortness of breath or other systemic symptoms.  On exam, she had purulent tonsillitis and minimal rhonchi.  A CT angiogram revealed right upper and left lower lobe consolidations consistent with bilateral pneumonia.  She was admitted for treatment of bilateral pneumonia and strep throat. Patient was not septic on admission but met SIRS criteria after her admission. She received treatment with ceftriaxone and azithromycin for 1 day. She showed significant clinical improvement after 1 night and was discharged with oral antibiotics to take at home.     HPI:  29-year-old female history of HTN presents for evaluation of persistent fevers through taking antibiotics for strep throat which was diagnosed 2 days ago.   Clinically and hemodynamically stable   Admitted for strep throat and bilateral pneumonia   CURB-65 : 0 , PSI : 21 , risk of mortality 0.1%     #sore throat   had a positive strep result on friday   had also a strep isolate positive in june   HAs contact with her kids who get frequently sick but no recent sick contacts   was started on augmentin as OP , will stop and start on ceftriaxone as Inpatient   DC with     #bilateral pneumonia   #leukocytosis   Bilateral pneumonia on ct chest   RVP negative   follow up blood cultures taken in ED , s/p 1L bolus in ED   s/p ceftraixone and azithromycin in the ED   follow up MRSa nares , legionella and strep Ag in urine   DC on azithromycin 500mg for 1 day, augmentin 875 for 5 days.      #HTN   resume home dose of losartan 100 mg daily and HCTZ 12.5 mg daily Hospital summary: A 29-year-old female with a history of hypertension presented to the ED with persistent fevers, cough, and sore throat despite two days of antibiotics for strep throat diagnosed two days prior.  She reported fevers up to 104°F, sweating, and emesis, but denied shortness of breath or other systemic symptoms.  On exam, she had purulent tonsillitis and minimal rhonchi.  A CT angiogram revealed right upper and left lower lobe consolidations consistent with bilateral pneumonia.  She was admitted for treatment of bilateral pneumonia and strep throat. Patient was not septic on admission but met SIRS criteria after her admission. She received treatment with ceftriaxone and azithromycin for 1 day. She showed significant clinical improvement after 1 night and was discharged with oral antibiotics to take at home.     HPI:  29-year-old female history of HTN presents for evaluation of persistent fevers through taking antibiotics for strep throat which was diagnosed 2 days ago.   Clinically and hemodynamically stable   Admitted for strep throat and bilateral pneumonia   CURB-65 : 0 , PSI : 21 , risk of mortality 0.1%     #sore throat   had a positive strep result on Friday  had also a strep isolate positive in June   HAs contact with her kids who get frequently sick but no recent sick contacts   was started on amoxicillin as OP, will stop and start on ceftriaxone as Inpatient     #bilateral pneumonia   #leukocytosis   Bilateral pneumonia on ct chest   RVP negative   follow up blood cultures taken in ED , s/p 1L bolus in ED   s/p ceftraixone and azithromycin in the ED   DC on azithromycin 500mg for 1 day, augmentin 875 for 5 days.    #HTN   resume home dose of losartan 100 mg daily and HCTZ 12.5 mg daily

## 2024-10-28 NOTE — DISCHARGE NOTE PROVIDER - CARE PROVIDER_API CALL
Beverly Puga  Internal Medicine  [address] [place] [id number]  Phone: [phone]  Fax: [phone]  Follow Up Time: 1 week

## 2024-10-28 NOTE — DISCHARGE NOTE NURSING/CASE MANAGEMENT/SOCIAL WORK - NSDCPEFALRISK_GEN_ALL_CORE
For information on Fall & Injury Prevention, visit: [email or web address] OR  [email or web address] OR  https://www.cdc.gov/steadi/patient.html

## 2024-10-28 NOTE — DISCHARGE NOTE PROVIDER - NSDCMRMEDTOKEN_GEN_ALL_CORE_FT
hydroCHLOROthiazide 12.5 mg oral capsule: 1 cap(s) orally once a day  losartan 100 mg oral tablet: 1 tab(s) orally once a day   amoxicillin-clavulanate 875 mg-125 mg oral tablet: 875 milligram(s) orally 2 times a day  hydroCHLOROthiazide 12.5 mg oral capsule: 1 cap(s) orally once a day  losartan 100 mg oral tablet: 1 tab(s) orally once a day  Zithromax 500 mg oral tablet: 1 tab(s) orally once a day

## 2024-10-28 NOTE — DISCHARGE NOTE PROVIDER - NSDCCPCAREPLAN_GEN_ALL_CORE_FT
PRINCIPAL DISCHARGE DIAGNOSIS  Diagnosis: Multifocal pneumonia  Assessment and Plan of Treatment: You came to the hospital complaining of a fever. You were found to have a multifocal lung infection (pneumonia) in both lungs. This is likely due to bacteria from your mouth migrating to your lungs. You were sent home with antibiotics. Please finish the course of antibiotics as prescribed to ensure the resolution of the pneumonia.  Please follow up with your PCP in 1 week.      SECONDARY DISCHARGE DIAGNOSES  Diagnosis: Pharyngitis  Assessment and Plan of Treatment:

## 2024-10-28 NOTE — DISCHARGE NOTE NURSING/CASE MANAGEMENT/SOCIAL WORK - FINANCIAL ASSISTANCE
[facility] provides services at a reduced cost to those who are determined to be eligible through [facility]’s financial assistance program. Information regarding [facility]’s financial assistance program can be found by going to [email or web address] or by calling [phone].

## 2024-10-28 NOTE — DISCHARGE NOTE PROVIDER - ATTENDING DISCHARGE PHYSICAL EXAMINATION:
Patient seen and examined 10/28/2024    Vital Signs Last 24 Hrs  T(C): 36.8 (28 Oct 2024 15:33), Max: 37.3 (27 Oct 2024 16:58)  T(F): 98.2 (28 Oct 2024 15:33), Max: 99.1 (27 Oct 2024 16:58)  HR: 104 (28 Oct 2024 15:33) (81 - 109)  BP: 121/88 (28 Oct 2024 15:33) (121/88 - 178/135)  BP(mean): --  RR: 18 (28 Oct 2024 15:33) (17 - 18)  SpO2: 96% (28 Oct 2024 15:33) (95% - 98%)    Parameters below as of 28 Oct 2024 15:33  Patient On (Oxygen Delivery Method): room air    PHYSICAL EXAM:  GENERAL: NAD, sitting up in bed  PSYCH: no agitation, baseline mentation  NERVOUS SYSTEM:  Alert, freely moving all extremities  PULMONARY: Clear to auscultation bilaterally  CARDIOVASCULAR: Regular rate and rhythm  GI: Soft, Nontender  EXTREMITIES:  No cyanosis or edema  SKIN: No obvious rashes or lesions

## 2024-10-29 LAB
LEGIONELLA AG UR QL: NEGATIVE — SIGNIFICANT CHANGE UP
S PNEUM AG UR QL: NEGATIVE — SIGNIFICANT CHANGE UP

## 2024-10-29 RX ORDER — AZITHROMYCIN DIHYDRATE 200 MG/5ML
1 POWDER, FOR SUSPENSION ORAL
Qty: 1 | Refills: 0
Start: 2024-10-29 | End: 2024-10-29

## 2024-10-29 RX ORDER — AMOXICILLIN AND CLAVULANATE POTASSIUM 600; 42.9 MG/5ML; MG/5ML
875 POWDER, FOR SUSPENSION ORAL
Qty: 10 | Refills: 0
Start: 2024-10-29 | End: 2024-11-02

## 2024-11-02 LAB
CULTURE RESULTS: SIGNIFICANT CHANGE UP
CULTURE RESULTS: SIGNIFICANT CHANGE UP
SPECIMEN SOURCE: SIGNIFICANT CHANGE UP
SPECIMEN SOURCE: SIGNIFICANT CHANGE UP

## 2024-11-08 DIAGNOSIS — J15.8 PNEUMONIA DUE TO OTHER SPECIFIED BACTERIA: ICD-10-CM

## 2024-11-08 DIAGNOSIS — I10 ESSENTIAL (PRIMARY) HYPERTENSION: ICD-10-CM

## 2024-11-08 DIAGNOSIS — Z11.52 ENCOUNTER FOR SCREENING FOR COVID-19: ICD-10-CM

## 2024-11-08 DIAGNOSIS — J02.0 STREPTOCOCCAL PHARYNGITIS: ICD-10-CM

## 2024-11-18 NOTE — ED PROVIDER NOTE - NS ED NOTE AC HIGH RISK COUNTRIES
Update History & Physical    The patient's History and Physical of November 17, 2024 was reviewed with the patient and I examined the patient.Asked to perform cardiac catheterization.    Risks, benefits and alternatives to cardiac cath and possible intervention were discussed with the patient including bleeding, heart attack, stroke, kidney failure and limb loss     Plan: The risks, benefits, expected outcome, and alternative to the recommended procedure have been discussed with the patient. Patient understands and wants to proceed with the procedure.     Electronically signed by Anastacio Denny MD on 11/18/2024 at 3:30 PM       No

## 2025-06-24 NOTE — ED ADULT NURSE NOTE - NIH STROKE SCALE: 6B. MOTOR LEG, RIGHT, QM
sleep apnea)       PE  There were no vitals filed for this visit.  Estimated body mass index is 29.29 kg/m² as calculated from the following:    Height as of 6/10/25: 1.829 m (6').    Weight as of 6/10/25: 98 kg (216 lb).    [INSTRUCTIONS:  \"[x]\" Indicates a positive item  \"[]\" Indicates a negative item  -- DELETE ALL ITEMS NOT EXAMINED]    Constitutional: [x] Appears well-developed and well-nourished [x] No apparent distress      [] Abnormal -     Mental status: [x] Alert and awake  [x] Oriented to person/place/time [x] Able to follow commands    [] Abnormal -     Eyes:   EOM    [x]  Normal    [] Abnormal -   Sclera  [x]  Normal    [] Abnormal -          Discharge [x]  None visible   [] Abnormal -     HENT: [x] Normocephalic, atraumatic  [] Abnormal -   [x] Mouth/Throat: Mucous membranes are moist    External Ears [x] Normal  [] Abnormal -    Neck: [x] No visualized mass [] Abnormal -     Pulmonary/Chest: [x] Respiratory effort normal   [x] No visualized signs of difficulty breathing or respiratory distress        [] Abnormal -      Musculoskeletal:   [x] Normal gait with no signs of ataxia         [x] Normal range of motion of neck        [] Abnormal -     Neurological:        [x] No Facial Asymmetry (Cranial nerve 7 motor function) (limited exam due to video visit)          [x] No gaze palsy        [] Abnormal -          Skin:        [x] No significant exanthematous lesions or discoloration noted on facial skin         [] Abnormal -            Psychiatric:       [x] Normal Affect [] Abnormal -        [x] No Hallucinations    Other pertinent observable physical exam findings:-      Jr Arroyo, was evaluated through a synchronous (real-time) audio-video encounter. The patient (or guardian if applicable) is aware that this is a billable service, which includes applicable co-pays. This Virtual Visit was conducted with patient's (and/or legal guardian's) consent. Patient identification was verified, and a 
(0) No drift; leg holds 30 degree position for full 5 secs